# Patient Record
Sex: FEMALE | Race: BLACK OR AFRICAN AMERICAN | NOT HISPANIC OR LATINO | URBAN - METROPOLITAN AREA
[De-identification: names, ages, dates, MRNs, and addresses within clinical notes are randomized per-mention and may not be internally consistent; named-entity substitution may affect disease eponyms.]

---

## 2019-01-11 ENCOUNTER — EMERGENCY (EMERGENCY)
Facility: HOSPITAL | Age: 59
LOS: 1 days | Discharge: HOME | End: 2019-01-11
Attending: EMERGENCY MEDICINE
Payer: COMMERCIAL

## 2019-01-11 VITALS
TEMPERATURE: 98 F | HEIGHT: 66 IN | SYSTOLIC BLOOD PRESSURE: 164 MMHG | RESPIRATION RATE: 16 BRPM | WEIGHT: 149.91 LBS | HEART RATE: 86 BPM | OXYGEN SATURATION: 97 % | DIASTOLIC BLOOD PRESSURE: 94 MMHG

## 2019-01-11 LAB
HCT VFR BLD CALC: 38.7 % — SIGNIFICANT CHANGE UP (ref 34.5–45)
HGB BLD-MCNC: 12.5 G/DL — SIGNIFICANT CHANGE UP (ref 11.5–15.5)
MCHC RBC-ENTMCNC: 26.2 PG — LOW (ref 27–34)
MCHC RBC-ENTMCNC: 32.3 GM/DL — SIGNIFICANT CHANGE UP (ref 32–36)
MCV RBC AUTO: 81.1 FL — SIGNIFICANT CHANGE UP (ref 80–100)
PLATELET # BLD AUTO: 182 K/UL — SIGNIFICANT CHANGE UP (ref 150–400)
RBC # BLD: 4.77 M/UL — SIGNIFICANT CHANGE UP (ref 3.8–5.2)
RBC # FLD: 12.5 % — SIGNIFICANT CHANGE UP (ref 10.3–14.5)
WBC # BLD: 3.7 K/UL — LOW (ref 3.8–10.5)
WBC # FLD AUTO: 3.7 K/UL — LOW (ref 3.8–10.5)

## 2019-01-11 PROCEDURE — 99284 EMERGENCY DEPT VISIT MOD MDM: CPT

## 2019-01-11 NOTE — ED ADULT NURSE NOTE - OBJECTIVE STATEMENT
58 y.o female presents to ED from home, told to come to ED by PCP for hemoglobin A1C of 14. Pt has physical on 12/27 which showed elevated hemoglobin A1C, had blood redrawn and was still same level, told to come to ED for further assessment since PCP was out of office. Pt presents without symptoms. Says she has been drinking more water lately, but because she is trying to lose weight and not because of increased thirst. Denying n/v/d, chest pain, SOB, cough, fever, chills. Lungs clear b/l. Skin warm, dry, intact. Gross motor and neuro intact. 20G IV placed in R hand.

## 2019-01-11 NOTE — ED ADULT TRIAGE NOTE - CHIEF COMPLAINT QUOTE
pt had blood drawn during her physical on 12/27 and was called today and told her hemoglobin A1C is 14 and was told to come to the ED  pt has no complaints

## 2019-01-11 NOTE — ED ADULT NURSE NOTE - NSIMPLEMENTINTERV_GEN_ALL_ED
Implemented All Universal Safety Interventions:  [place] to call system. Call bell, personal items and telephone within reach. Instruct patient to call for assistance. Room bathroom lighting operational. Non-slip footwear when patient is off stretcher. Physically safe environment: no spills, clutter or unnecessary equipment. Stretcher in lowest position, wheels locked, appropriate side rails in place.

## 2019-01-12 VITALS
DIASTOLIC BLOOD PRESSURE: 90 MMHG | RESPIRATION RATE: 18 BRPM | HEART RATE: 84 BPM | SYSTOLIC BLOOD PRESSURE: 156 MMHG | OXYGEN SATURATION: 98 %

## 2019-01-12 LAB
ALBUMIN SERPL ELPH-MCNC: 4.2 G/DL — SIGNIFICANT CHANGE UP (ref 3.3–5)
ALP SERPL-CCNC: 133 U/L — HIGH (ref 40–120)
ALT FLD-CCNC: 16 U/L — SIGNIFICANT CHANGE UP (ref 10–45)
ANION GAP SERPL CALC-SCNC: 13 MMOL/L — SIGNIFICANT CHANGE UP (ref 5–17)
APPEARANCE UR: CLEAR — SIGNIFICANT CHANGE UP
AST SERPL-CCNC: 12 U/L — SIGNIFICANT CHANGE UP (ref 10–40)
BACTERIA # UR AUTO: NEGATIVE — SIGNIFICANT CHANGE UP
BILIRUB SERPL-MCNC: 0.3 MG/DL — SIGNIFICANT CHANGE UP (ref 0.2–1.2)
BILIRUB UR-MCNC: NEGATIVE — SIGNIFICANT CHANGE UP
BUN SERPL-MCNC: 12 MG/DL — SIGNIFICANT CHANGE UP (ref 7–23)
CALCIUM SERPL-MCNC: 9.5 MG/DL — SIGNIFICANT CHANGE UP (ref 8.4–10.5)
CHLORIDE SERPL-SCNC: 99 MMOL/L — SIGNIFICANT CHANGE UP (ref 96–108)
CO2 SERPL-SCNC: 26 MMOL/L — SIGNIFICANT CHANGE UP (ref 22–31)
COLOR SPEC: SIGNIFICANT CHANGE UP
CREAT SERPL-MCNC: 0.51 MG/DL — SIGNIFICANT CHANGE UP (ref 0.5–1.3)
DIFF PNL FLD: NEGATIVE — SIGNIFICANT CHANGE UP
EPI CELLS # UR: 0 /HPF — SIGNIFICANT CHANGE UP
GLUCOSE SERPL-MCNC: 248 MG/DL — HIGH (ref 70–99)
GLUCOSE UR QL: ABNORMAL
HYALINE CASTS # UR AUTO: 0 /LPF — SIGNIFICANT CHANGE UP (ref 0–2)
KETONES UR-MCNC: ABNORMAL
LEUKOCYTE ESTERASE UR-ACNC: NEGATIVE — SIGNIFICANT CHANGE UP
NITRITE UR-MCNC: NEGATIVE — SIGNIFICANT CHANGE UP
PH UR: 6.5 — SIGNIFICANT CHANGE UP (ref 5–8)
POTASSIUM SERPL-MCNC: 4 MMOL/L — SIGNIFICANT CHANGE UP (ref 3.5–5.3)
POTASSIUM SERPL-SCNC: 4 MMOL/L — SIGNIFICANT CHANGE UP (ref 3.5–5.3)
PROT SERPL-MCNC: 7.4 G/DL — SIGNIFICANT CHANGE UP (ref 6–8.3)
PROT UR-MCNC: ABNORMAL
RBC CASTS # UR COMP ASSIST: 1 /HPF — SIGNIFICANT CHANGE UP (ref 0–4)
SODIUM SERPL-SCNC: 138 MMOL/L — SIGNIFICANT CHANGE UP (ref 135–145)
SP GR SPEC: 1.02 — SIGNIFICANT CHANGE UP (ref 1.01–1.02)
UROBILINOGEN FLD QL: NEGATIVE — SIGNIFICANT CHANGE UP
WBC UR QL: 1 /HPF — SIGNIFICANT CHANGE UP (ref 0–5)

## 2019-01-12 PROCEDURE — 99283 EMERGENCY DEPT VISIT LOW MDM: CPT

## 2019-01-12 PROCEDURE — 81001 URINALYSIS AUTO W/SCOPE: CPT

## 2019-01-12 PROCEDURE — 85027 COMPLETE CBC AUTOMATED: CPT

## 2019-01-12 PROCEDURE — 82962 GLUCOSE BLOOD TEST: CPT

## 2019-01-12 PROCEDURE — 80053 COMPREHEN METABOLIC PANEL: CPT

## 2019-01-12 NOTE — ED PROVIDER NOTE - OBJECTIVE STATEMENT
57 yo otherwise healthy F sent in by PMD for elevated A1C 14, called today and told about results from 12/27. Pt reports one episode blurry vision two days ago, no pain, no current visual changes. Polydipsia, denies increased urination. No fevers/chills, no abdominal pain, no diarrhea/constipation.    No family hx DM2, sone with juvenile DM

## 2019-01-12 NOTE — ED PROVIDER NOTE - PRINCIPAL DIAGNOSIS
Diabetes mellitus Type 2 diabetes mellitus with hyperglycemia, unspecified whether long term insulin use

## 2019-01-12 NOTE — ED PROVIDER NOTE - PROGRESS NOTE DETAILS
Virgie PGY2: discussed lab findings with pt, Glucose 223, no anion gap, will dc with rec fu pcp for starting management. Attending MD Astudillo: Discussed labs, emphasized as before.  Stable for discharge. Follow up instructions given, importance of follow up emphasized, return to ED parameters reviewed and patient verbalized understanding.  All questions answered, all concerns addressed.

## 2019-01-12 NOTE — ED PROVIDER NOTE - ATTENDING CONTRIBUTION TO CARE
Attending MD Astudillo: I personally have seen and examined this patient.  Resident note reviewed and agree on plan of care and except where noted.  See below for details.     Seen in [place] [facility]     58F with PMH/PSH including asthma, hysterectomy, appendectomy, "gland in neck with stone", bilateral ovarian sx presents to the ED with elevated Hb A1c.  Reports that she went to her PMD on 12/27/18 and had blood tests.  Reports on Wednesday was told about her elevated HbA1c and was told to follow up.  Reports she has been having some fluctuations in her vision, but no sudden vision loss or double vision.  Reports this has been happening for months.  Reports that she also has polydipsia, but reports urinates frequently at baseline and has not noted an increased, denies dysuria or urgency.  Denies fevers, chills. Denies abdominal pain, nausea, vomiting, diarrhea, blood in stools. Reports that at her latest visit was told that she had elevated blood pressure as well and was started on a medication (5mg of ?) but did not pick it up.  Denies EtOH, tobacco, drugs.  Reports allergy to Amoxicillin.  Reports recent INTENTIONAL 25lb weight loss.  On exam, NAD, head NCAT, PERRL, FROM at neck, no tenderness to midline palpation, no stepoffs along length of spine, lungs CTAB with good inspiratory effort, +S1S2, no m/r/g, abdomen soft with +BS, NT, ND, no CVAT, moving all extremities with 5/5 strength bilateral upper and lower extremities, good and equal  strength bilaterally, sensory grossly intact, no pitting edema, no calf tenderness, swelling, erythema or warmth; A/P: 58F with abnormal lab, elevated Hb A1c, needs improved outpatient glucose control, explained nothing to do acutely if labs are nonactionable (will rule out DKA or HHS, although doubt), explained need outpatient follow up and should  medications prescribed to her and take medications as directed and not stop or start new meds without discussing with her physician.  Verbalized understanding.

## 2019-01-12 NOTE — ED PROVIDER NOTE - CARE PLAN
Principal Discharge DX:	Diabetes mellitus Principal Discharge DX:	Type 2 diabetes mellitus with hyperglycemia, unspecified whether long term insulin use

## 2019-01-12 NOTE — ED PROVIDER NOTE - NSFOLLOWUPINSTRUCTIONS_ED_ALL_ED_FT
Rest, drink plenty of fluids.  Advance activity as tolerated.  Continue all previously prescribed medications as directed.  Follow up with your primary care physician in 48-72 hours- bring copies of your results.  Return to the ER for worsening or persistent symptoms, and/or ANY NEW OR CONCERNING SYMPTOMS. If you have issues obtaining follow up, please call: [phone] to obtain a doctor or specialist who takes your insurance in your area.

## 2020-10-17 ENCOUNTER — EMERGENCY (EMERGENCY)
Facility: HOSPITAL | Age: 60
LOS: 1 days | Discharge: HOME | End: 2020-10-17
Attending: EMERGENCY MEDICINE
Payer: COMMERCIAL

## 2020-10-17 VITALS
TEMPERATURE: 98 F | RESPIRATION RATE: 18 BRPM | HEIGHT: 66 IN | OXYGEN SATURATION: 99 % | WEIGHT: 132.06 LBS | DIASTOLIC BLOOD PRESSURE: 81 MMHG | SYSTOLIC BLOOD PRESSURE: 146 MMHG | HEART RATE: 76 BPM

## 2020-10-17 PROCEDURE — 70450 CT HEAD/BRAIN W/O DYE: CPT | Mod: 26

## 2020-10-17 PROCEDURE — 99053 MED SERV 10PM-8AM 24 HR FAC: CPT

## 2020-10-17 PROCEDURE — 99284 EMERGENCY DEPT VISIT MOD MDM: CPT

## 2020-10-17 PROCEDURE — 99284 EMERGENCY DEPT VISIT MOD MDM: CPT | Mod: 25

## 2020-10-17 PROCEDURE — 70450 CT HEAD/BRAIN W/O DYE: CPT

## 2020-10-17 PROCEDURE — 73140 X-RAY EXAM OF FINGER(S): CPT | Mod: 26,LT

## 2020-10-17 PROCEDURE — 73140 X-RAY EXAM OF FINGER(S): CPT

## 2020-10-17 RX ORDER — LIDOCAINE 4 G/100G
1 CREAM TOPICAL ONCE
Refills: 0 | Status: COMPLETED | OUTPATIENT
Start: 2020-10-17 | End: 2020-10-17

## 2020-10-17 RX ORDER — ACETAMINOPHEN 500 MG
975 TABLET ORAL ONCE
Refills: 0 | Status: COMPLETED | OUTPATIENT
Start: 2020-10-17 | End: 2020-10-17

## 2020-10-17 RX ADMIN — LIDOCAINE 1 PATCH: 4 CREAM TOPICAL at 23:24

## 2020-10-17 RX ADMIN — Medication 975 MILLIGRAM(S): at 23:24
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
## 2020-10-17 NOTE — ED ADULT NURSE NOTE - CAS EDN DISCHARGE ASSESSMENT
Alert and oriented to person, place and time Alert and oriented to person, place and time/Pt given finger splint, MD Mosqueda at bedside to give patient results

## 2020-10-17 NOTE — ED PROVIDER NOTE - PATIENT PORTAL LINK FT
You can access the FollowMyHealth Patient Portal offered by [facility] by registering at the following website: [email or web address]. By joining [facility]’s FollowMyHealth portal, you will also be able to view your health information using other applications (apps) compatible with our system.

## 2020-10-17 NOTE — ED PROVIDER NOTE - NEUROLOGICAL, MLM
PATIENT DID NOT GO TO PST/ DID NOT GET SPONGES, NOTHING ORDERED BY SURGEON Alert and oriented, no focal deficits, no motor or sensory deficits.
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
## 2020-10-17 NOTE — ED PROVIDER NOTE - NSFOLLOWUPCLINICS_GEN_ALL_ED_FT
Concussion Program  Concussion  .  [place]   Phone: [phone]  Fax:   Follow Up Time: 1-3 Days    [facility]  Orthopedics  .  [place]   Phone: [phone]  Fax:   Follow Up Time: 1-3 Days
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
## 2020-10-18 VITALS
SYSTOLIC BLOOD PRESSURE: 139 MMHG | TEMPERATURE: 98 F | RESPIRATION RATE: 18 BRPM | HEART RATE: 90 BPM | DIASTOLIC BLOOD PRESSURE: 85 MMHG | OXYGEN SATURATION: 100 %

## 2020-10-18 NOTE — ED PROCEDURE NOTE - PROCEDURE ADDITIONAL DETAILS
Pt placed in splint due to pain and swelling at L index finger PIP joint with decreased flexion. Xray w/o fracture, possible ligamentous injury vs swelling.

## 2020-10-19 ENCOUNTER — APPOINTMENT (OUTPATIENT)
Dept: ORTHOPEDIC SURGERY | Facility: CLINIC | Age: 60
End: 2020-10-19
Payer: COMMERCIAL

## 2020-10-19 VITALS
HEART RATE: 86 BPM | HEIGHT: 65 IN | SYSTOLIC BLOOD PRESSURE: 138 MMHG | WEIGHT: 133 LBS | DIASTOLIC BLOOD PRESSURE: 81 MMHG | BODY MASS INDEX: 22.16 KG/M2

## 2020-10-19 PROCEDURE — 99204 OFFICE O/P NEW MOD 45 MIN: CPT

## 2020-10-19 PROCEDURE — 99072 ADDL SUPL MATRL&STAF TM PHE: CPT

## 2020-10-19 NOTE — DISCUSSION/SUMMARY
[de-identified] : We discussed the definition of concussion and symptoms at length\par Reviewed risk factors for prolonged concussion recovery\par Discussed physical and mental rest at length\par Discussed modification of activities at work at length\par Scheduled tylenol alternating with ibuprofen for headaches\par zofran prescribed for headaches\par Heating pad for neck pain\par discussed possible PT referral for vestibulocular rehab/balance training\par Report any worsening symptoms\par Discussed the importance of sleep hygiene\par No driving unless cleared\par No alcohol\par No activities that would increase heart rate until cleared\par Follow up in one week      \par \par Lola Barron MD, EdM\par Sports Medicine PM&R\par
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
## 2020-10-19 NOTE — PHYSICAL EXAM
[de-identified] : Exam:\par Alert and oriented to place, time, date, year\par Alert no acute distress\par Answers questions appropriately\par Neck full range of motion\par No Tenderness to palpation of the neck\par Cranial nerves intact\par Extraocular movements are intact; No nystagmus\par no Pain with upward lateral or lateral gaze: \par Strength in upper and lower extremities is 5 out of 5 with no focal deficits\par Normal sensation\par Photophobia +:\par Nod testing +\par Side to side head movement +\par Convergence 10cm\par Finger to nose is appropriate\par Romberg testing is negative\par Heel to toe, toe to heel normal\par Modified NOE\par double leg stance with 0 errors\par single leg stance with >4 errors\par tandem stance with>4 errors\par \par

## 2020-10-20 PROBLEM — E11.9 TYPE 2 DIABETES MELLITUS WITHOUT COMPLICATIONS: Chronic | Status: ACTIVE | Noted: 2020-10-19

## 2020-10-20 PROBLEM — E78.5 HYPERLIPIDEMIA, UNSPECIFIED: Chronic | Status: ACTIVE | Noted: 2020-10-19

## 2020-10-20 PROBLEM — I10 ESSENTIAL (PRIMARY) HYPERTENSION: Chronic | Status: ACTIVE | Noted: 2020-10-19

## 2020-10-21 ENCOUNTER — APPOINTMENT (OUTPATIENT)
Dept: ORTHOPEDIC SURGERY | Facility: CLINIC | Age: 60
End: 2020-10-21
Payer: COMMERCIAL

## 2020-10-21 PROCEDURE — 99214 OFFICE O/P EST MOD 30 MIN: CPT

## 2020-10-21 PROCEDURE — 72040 X-RAY EXAM NECK SPINE 2-3 VW: CPT

## 2020-10-21 PROCEDURE — 99072 ADDL SUPL MATRL&STAF TM PHE: CPT

## 2020-10-23 ENCOUNTER — APPOINTMENT (OUTPATIENT)
Dept: ORTHOPEDIC SURGERY | Facility: CLINIC | Age: 60
End: 2020-10-23
Payer: COMMERCIAL

## 2020-10-23 PROCEDURE — 99072 ADDL SUPL MATRL&STAF TM PHE: CPT

## 2020-10-23 PROCEDURE — 20600 DRAIN/INJ JOINT/BURSA W/O US: CPT | Mod: F1

## 2020-10-23 PROCEDURE — 99214 OFFICE O/P EST MOD 30 MIN: CPT | Mod: 25

## 2020-10-26 ENCOUNTER — APPOINTMENT (OUTPATIENT)
Dept: ORTHOPEDIC SURGERY | Facility: CLINIC | Age: 60
End: 2020-10-26
Payer: COMMERCIAL

## 2020-10-26 VITALS
DIASTOLIC BLOOD PRESSURE: 78 MMHG | SYSTOLIC BLOOD PRESSURE: 135 MMHG | WEIGHT: 133 LBS | HEART RATE: 82 BPM | BODY MASS INDEX: 22.16 KG/M2 | HEIGHT: 65 IN

## 2020-10-26 PROCEDURE — 99214 OFFICE O/P EST MOD 30 MIN: CPT

## 2020-10-26 PROCEDURE — 99072 ADDL SUPL MATRL&STAF TM PHE: CPT

## 2020-10-26 NOTE — HISTORY OF PRESENT ILLNESS
[de-identified] : Aishwarya presents for follow up of concussion and neck pain.  Her last appointment was on 10/20/20 at which time I referred her to interventional spine for her neck and neurology for her headaches.  She returns today feeling no improvement.  She has not made any appointments with the other providers.  She has not started PT.  She is not currently working.

## 2020-10-26 NOTE — DISCUSSION/SUMMARY
[de-identified] : Aishwarya and I discussed her pain.  For her concussion, I have assured her that all of the symptoms she is feeling are normal for a post-concussive state.  I once again recommended she start PT.  Given the severity of her headaches and persistent symptoms, I recommend she follow up with a TBI physician vs neurology, as I have no further recommendations for her care.  \par I have advised her that she will need to rest and avoid intense activity.  She also appears to be anxious about her symptoms, which is likely making them worse. Her headaches may also have a cervical component for which she will be seeing Dr. Mary this week.  Follow up as needed.\par \par Lola Barron MD, EdM\par Sports Medicine PM&R\par Department of Orthopedics\par

## 2020-10-26 NOTE — PHYSICAL EXAM
[de-identified] : EXAM: \par Gen: in no acute distress, seated comfortably, moving easily\par Skin: No discoloration, rashes; on palpation skin is dry, \par Neuro: Normal sensation all dermatomes, motor all myotomes\par Vascular: Normal pulses, no edema, normal temperature\par Coordination and balance: Normal\par Psych: normal mood and affect, non pressured speech, alert and oriented x3\par Musculoskeletal:\par cervical spine\par Inspection reveals head forward posture\par Range of motion testing shows restricted and painful ROM with rotation\par spurlings -\par + tenderness to palpation of paraspinal muscles. \par + TTP trapezius, splenius capitus, levator, rhomboids\par NEURO - Normal bulk and tone \par UE strength 5/5 including shoulder abduction, biceps, triceps, wrist extensors, finger flexors, interossei, and APB \par Sensation - intact to light touch in bilateral upper extremities. \par UE Reflexes 2+ biceps, triceps, brachioradialis \par LE Reflexes  2+ patellar and Achilles reflexes bilaterally \par No Hoffmans\par Coordination was age appropriate and intact in all 4 limbs. \par GAIT - Normal base, normal stride length, non-antalgic \par \par

## 2020-10-28 ENCOUNTER — APPOINTMENT (OUTPATIENT)
Dept: PHYSICAL MEDICINE AND REHAB | Facility: CLINIC | Age: 60
End: 2020-10-28
Payer: COMMERCIAL

## 2020-10-28 VITALS
BODY MASS INDEX: 22.16 KG/M2 | DIASTOLIC BLOOD PRESSURE: 82 MMHG | TEMPERATURE: 97.3 F | HEIGHT: 65 IN | OXYGEN SATURATION: 100 % | SYSTOLIC BLOOD PRESSURE: 150 MMHG | WEIGHT: 133 LBS | HEART RATE: 84 BPM

## 2020-10-28 DIAGNOSIS — Z87.09 PERSONAL HISTORY OF OTHER DISEASES OF THE RESPIRATORY SYSTEM: ICD-10-CM

## 2020-10-28 DIAGNOSIS — Z82.49 FAMILY HISTORY OF ISCHEMIC HEART DISEASE AND OTHER DISEASES OF THE CIRCULATORY SYSTEM: ICD-10-CM

## 2020-10-28 DIAGNOSIS — Z78.9 OTHER SPECIFIED HEALTH STATUS: ICD-10-CM

## 2020-10-28 PROCEDURE — 99215 OFFICE O/P EST HI 40 MIN: CPT

## 2020-10-28 PROCEDURE — 99072 ADDL SUPL MATRL&STAF TM PHE: CPT
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
## 2020-10-28 NOTE — PHYSICAL EXAM
[Normal] : Oriented to person, place, and time, insight and judgement were intact and the affect was normal [FreeTextEntry1] : C Spine: no gross deformity, TTP over the right cervical paraspinals, ROM limited in all planes, positive Spurling's on the right, negative Jefferson's\par \par \par L spine: No gross deformity, TTP of the lumbar paraspinals: right, ROM limited with all planes, worst with flexion, SLR negative\par

## 2020-11-02 ENCOUNTER — APPOINTMENT (OUTPATIENT)
Dept: PHYSICAL MEDICINE AND REHAB | Facility: CLINIC | Age: 60
End: 2020-11-02
Payer: COMMERCIAL

## 2020-11-02 VITALS
SYSTOLIC BLOOD PRESSURE: 146 MMHG | WEIGHT: 133 LBS | TEMPERATURE: 98.1 F | HEART RATE: 73 BPM | DIASTOLIC BLOOD PRESSURE: 89 MMHG | BODY MASS INDEX: 22.16 KG/M2 | OXYGEN SATURATION: 97 % | HEIGHT: 65 IN

## 2020-11-02 DIAGNOSIS — Z86.39 PERSONAL HISTORY OF OTHER ENDOCRINE, NUTRITIONAL AND METABOLIC DISEASE: ICD-10-CM

## 2020-11-02 DIAGNOSIS — Z80.9 FAMILY HISTORY OF MALIGNANT NEOPLASM, UNSPECIFIED: ICD-10-CM

## 2020-11-02 DIAGNOSIS — S06.0X9A CONCUSSION WITH LOSS OF CONSCIOUSNESS OF UNSPECIFIED DURATION, INITIAL ENCOUNTER: ICD-10-CM

## 2020-11-02 PROCEDURE — 99072 ADDL SUPL MATRL&STAF TM PHE: CPT

## 2020-11-02 PROCEDURE — 99214 OFFICE O/P EST MOD 30 MIN: CPT

## 2020-11-02 RX ORDER — FLASH GLUCOSE SENSOR
KIT MISCELLANEOUS
Qty: 2 | Refills: 0 | Status: ACTIVE | COMMUNITY
Start: 2020-08-28

## 2020-11-02 RX ORDER — CYCLOBENZAPRINE HYDROCHLORIDE 5 MG/1
5 TABLET, FILM COATED ORAL
Qty: 28 | Refills: 0 | Status: DISCONTINUED | COMMUNITY
Start: 2020-10-21 | End: 2020-11-02

## 2020-11-02 RX ORDER — ONDANSETRON 4 MG/1
4 TABLET, ORALLY DISINTEGRATING ORAL EVERY 8 HOURS
Qty: 14 | Refills: 0 | Status: DISCONTINUED | COMMUNITY
Start: 2020-10-19 | End: 2020-11-02

## 2020-11-02 RX ORDER — CYCLOBENZAPRINE HYDROCHLORIDE 10 MG/1
10 TABLET, FILM COATED ORAL
Qty: 30 | Refills: 0 | Status: DISCONTINUED | COMMUNITY
Start: 2020-10-28 | End: 2020-11-02
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
## 2020-11-02 NOTE — PHYSICAL EXAM
[Normal] : Alert and in no acute distress [de-identified] : seated in chair [de-identified] : EOMI- smooth pursuits normal  [de-identified] : AROM limited in all planes [de-identified] : Breathing comfortably  [de-identified] : Limited active ROM right shoulder abduction [de-identified] : aaox3, no aphasia or dysarthria, Immediate recall 3/3, delayed in 5 minutes 1/3, 2/3 with multiple choices, Facial sensation intact, no facial weakness, Motor LUE 5/5 except left index finger with limited flexion, Right shoulder Active abduction - limited to 90*, otherwise 5/5, LE 5/5, sensory intact to light touch, fine motor coordination intact, no pronator drift, Gait normal, tandem walking intact. Tandem stance is intact, Romberg test is negative

## 2020-11-06 ENCOUNTER — APPOINTMENT (OUTPATIENT)
Dept: MRI IMAGING | Facility: CLINIC | Age: 60
End: 2020-11-06
Payer: COMMERCIAL

## 2020-11-06 ENCOUNTER — OUTPATIENT (OUTPATIENT)
Dept: OUTPATIENT SERVICES | Facility: HOSPITAL | Age: 60
LOS: 1 days | End: 2020-11-06
Payer: COMMERCIAL

## 2020-11-06 DIAGNOSIS — S39.012A STRAIN OF MUSCLE, FASCIA AND TENDON OF LOWER BACK, INITIAL ENCOUNTER: ICD-10-CM

## 2020-11-06 PROCEDURE — 72148 MRI LUMBAR SPINE W/O DYE: CPT | Mod: 26

## 2020-11-06 PROCEDURE — 72141 MRI NECK SPINE W/O DYE: CPT | Mod: 26

## 2020-11-06 PROCEDURE — 72148 MRI LUMBAR SPINE W/O DYE: CPT

## 2020-11-06 PROCEDURE — 72141 MRI NECK SPINE W/O DYE: CPT

## 2020-11-08 ENCOUNTER — TRANSCRIPTION ENCOUNTER (OUTPATIENT)
Age: 60
End: 2020-11-08

## 2020-11-09 ENCOUNTER — APPOINTMENT (OUTPATIENT)
Dept: PHYSICAL MEDICINE AND REHAB | Facility: CLINIC | Age: 60
End: 2020-11-09
Payer: COMMERCIAL

## 2020-11-09 DIAGNOSIS — G44.309 POST-TRAUMATIC HEADACHE, UNSPECIFIED, NOT INTRACTABLE: ICD-10-CM

## 2020-11-09 PROCEDURE — 99212 OFFICE O/P EST SF 10 MIN: CPT | Mod: 95

## 2020-11-09 RX ORDER — MELOXICAM 7.5 MG/1
7.5 TABLET ORAL
Qty: 14 | Refills: 0 | Status: ACTIVE | COMMUNITY
Start: 2020-10-21 | End: 1900-01-01
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
## 2020-11-09 NOTE — PHYSICAL EXAM
[FreeTextEntry1] : Exam is limited due to the nature of this visit.\par She appears comfortable and in NAD
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
## 2020-11-09 NOTE — REVIEW OF SYSTEMS
[Fever] : no fever [Headache] : headache [Dizziness] : dizziness [Fainting] : no fainting [Negative] : Eyes

## 2020-11-09 NOTE — REASON FOR VISIT
[Home] : at home, [unfilled] , at the time of the visit. [Medical Office: ([facility])___] : at the medical office located in  [Verbal consent obtained from patient] : the patient, [unfilled] [Follow-Up] : a follow-up visit

## 2020-11-11 ENCOUNTER — APPOINTMENT (OUTPATIENT)
Dept: PHYSICAL MEDICINE AND REHAB | Facility: CLINIC | Age: 60
End: 2020-11-11
Payer: COMMERCIAL

## 2020-11-11 VITALS
DIASTOLIC BLOOD PRESSURE: 92 MMHG | HEART RATE: 76 BPM | OXYGEN SATURATION: 99 % | SYSTOLIC BLOOD PRESSURE: 142 MMHG | TEMPERATURE: 97.6 F

## 2020-11-11 PROCEDURE — 99072 ADDL SUPL MATRL&STAF TM PHE: CPT

## 2020-11-11 PROCEDURE — 99214 OFFICE O/P EST MOD 30 MIN: CPT

## 2020-11-14 NOTE — ASSESSMENT
[FreeTextEntry1] : MRI discussed, just started PT, too early to evaluate if helping or not. Continue PT. Follow up in 4 weeks.
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
## 2020-12-09 ENCOUNTER — APPOINTMENT (OUTPATIENT)
Dept: PHYSICAL MEDICINE AND REHAB | Facility: CLINIC | Age: 60
End: 2020-12-09
Payer: COMMERCIAL

## 2020-12-09 DIAGNOSIS — S39.012A STRAIN OF MUSCLE, FASCIA AND TENDON OF LOWER BACK, INITIAL ENCOUNTER: ICD-10-CM

## 2020-12-09 PROCEDURE — 99442: CPT

## 2020-12-10 ENCOUNTER — TRANSCRIPTION ENCOUNTER (OUTPATIENT)
Age: 60
End: 2020-12-10

## 2020-12-11 PROBLEM — S39.012A LUMBAR STRAIN: Status: ACTIVE | Noted: 2020-10-28

## 2021-01-14 ENCOUNTER — APPOINTMENT (OUTPATIENT)
Dept: ORTHOPEDIC SURGERY | Facility: CLINIC | Age: 61
End: 2021-01-14
Payer: COMMERCIAL

## 2021-01-14 VITALS
WEIGHT: 135 LBS | SYSTOLIC BLOOD PRESSURE: 132 MMHG | HEIGHT: 65 IN | BODY MASS INDEX: 22.49 KG/M2 | DIASTOLIC BLOOD PRESSURE: 77 MMHG | HEART RATE: 80 BPM

## 2021-01-14 DIAGNOSIS — M19.049 PRIMARY OSTEOARTHRITIS, UNSPECIFIED HAND: ICD-10-CM

## 2021-01-14 DIAGNOSIS — S63.639D SPRAIN OF INTERPHALANGEAL JOINT OF UNSPECIFIED FINGER, SUBSEQUENT ENCOUNTER: ICD-10-CM

## 2021-01-14 PROCEDURE — 73140 X-RAY EXAM OF FINGER(S): CPT | Mod: F1

## 2021-01-14 PROCEDURE — 99214 OFFICE O/P EST MOD 30 MIN: CPT | Mod: 25

## 2021-01-14 PROCEDURE — 99072 ADDL SUPL MATRL&STAF TM PHE: CPT

## 2021-01-14 PROCEDURE — 20600 DRAIN/INJ JOINT/BURSA W/O US: CPT | Mod: F1

## 2021-01-25 ENCOUNTER — TRANSCRIPTION ENCOUNTER (OUTPATIENT)
Age: 61
End: 2021-01-25

## 2021-02-11 ENCOUNTER — TRANSCRIPTION ENCOUNTER (OUTPATIENT)
Age: 61
End: 2021-02-11

## 2021-02-12 ENCOUNTER — APPOINTMENT (OUTPATIENT)
Dept: PAIN MANAGEMENT | Facility: CLINIC | Age: 61
End: 2021-02-12

## 2021-04-21 NOTE — ED ADULT NURSE NOTE - CAS ELECT INFOMATION PROVIDED
What Type Of Note Output Would You Prefer (Optional)?: Bullet Format
How Severe Is Your Acne?: moderate
Is This A New Presentation, Or A Follow-Up?: Acne
DC instructions

## 2021-05-08 ENCOUNTER — EMERGENCY (EMERGENCY)
Facility: HOSPITAL | Age: 61
LOS: 1 days | Discharge: HOME | End: 2021-05-08
Attending: EMERGENCY MEDICINE
Payer: COMMERCIAL

## 2021-05-08 VITALS
HEIGHT: 66 IN | RESPIRATION RATE: 18 BRPM | DIASTOLIC BLOOD PRESSURE: 84 MMHG | HEART RATE: 70 BPM | SYSTOLIC BLOOD PRESSURE: 132 MMHG | WEIGHT: 132.94 LBS | TEMPERATURE: 98 F | OXYGEN SATURATION: 98 %

## 2021-05-08 VITALS
RESPIRATION RATE: 18 BRPM | DIASTOLIC BLOOD PRESSURE: 83 MMHG | SYSTOLIC BLOOD PRESSURE: 149 MMHG | OXYGEN SATURATION: 100 % | HEART RATE: 74 BPM

## 2021-05-08 LAB
ALBUMIN SERPL ELPH-MCNC: 4.1 G/DL — SIGNIFICANT CHANGE UP (ref 3.3–5)
ALP SERPL-CCNC: 108 U/L — SIGNIFICANT CHANGE UP (ref 40–120)
ALT FLD-CCNC: 13 U/L — SIGNIFICANT CHANGE UP (ref 10–45)
ANION GAP SERPL CALC-SCNC: 12 MMOL/L — SIGNIFICANT CHANGE UP (ref 5–17)
APTT BLD: 35.4 SEC — SIGNIFICANT CHANGE UP (ref 27.5–35.5)
AST SERPL-CCNC: 13 U/L — SIGNIFICANT CHANGE UP (ref 10–40)
BASOPHILS # BLD AUTO: 0 K/UL — SIGNIFICANT CHANGE UP (ref 0–0.2)
BASOPHILS NFR BLD AUTO: 0 % — SIGNIFICANT CHANGE UP (ref 0–2)
BILIRUB SERPL-MCNC: 0.2 MG/DL — SIGNIFICANT CHANGE UP (ref 0.2–1.2)
BUN SERPL-MCNC: 17 MG/DL — SIGNIFICANT CHANGE UP (ref 7–23)
CALCIUM SERPL-MCNC: 9.3 MG/DL — SIGNIFICANT CHANGE UP (ref 8.4–10.5)
CHLORIDE SERPL-SCNC: 103 MMOL/L — SIGNIFICANT CHANGE UP (ref 96–108)
CO2 SERPL-SCNC: 25 MMOL/L — SIGNIFICANT CHANGE UP (ref 22–31)
CREAT SERPL-MCNC: 0.57 MG/DL — SIGNIFICANT CHANGE UP (ref 0.5–1.3)
D DIMER BLD IA.RAPID-MCNC: <150 NG/ML DDU — SIGNIFICANT CHANGE UP
EOSINOPHIL # BLD AUTO: 0.06 K/UL — SIGNIFICANT CHANGE UP (ref 0–0.5)
EOSINOPHIL NFR BLD AUTO: 1.7 % — SIGNIFICANT CHANGE UP (ref 0–6)
GLUCOSE SERPL-MCNC: 95 MG/DL — SIGNIFICANT CHANGE UP (ref 70–99)
HCT VFR BLD CALC: 40.3 % — SIGNIFICANT CHANGE UP (ref 34.5–45)
HGB BLD-MCNC: 12.3 G/DL — SIGNIFICANT CHANGE UP (ref 11.5–15.5)
INR BLD: 0.97 RATIO — SIGNIFICANT CHANGE UP (ref 0.88–1.16)
LYMPHOCYTES # BLD AUTO: 0.95 K/UL — LOW (ref 1–3.3)
LYMPHOCYTES # BLD AUTO: 28.7 % — SIGNIFICANT CHANGE UP (ref 13–44)
MANUAL SMEAR VERIFICATION: SIGNIFICANT CHANGE UP
MCHC RBC-ENTMCNC: 26 PG — LOW (ref 27–34)
MCHC RBC-ENTMCNC: 30.5 GM/DL — LOW (ref 32–36)
MCV RBC AUTO: 85.2 FL — SIGNIFICANT CHANGE UP (ref 80–100)
MONOCYTES # BLD AUTO: 0.17 K/UL — SIGNIFICANT CHANGE UP (ref 0–0.9)
MONOCYTES NFR BLD AUTO: 5.2 % — SIGNIFICANT CHANGE UP (ref 2–14)
NEUTROPHILS # BLD AUTO: 2.14 K/UL — SIGNIFICANT CHANGE UP (ref 1.8–7.4)
NEUTROPHILS NFR BLD AUTO: 64.4 % — SIGNIFICANT CHANGE UP (ref 43–77)
PLAT MORPH BLD: NORMAL — SIGNIFICANT CHANGE UP
PLATELET # BLD AUTO: 158 K/UL — SIGNIFICANT CHANGE UP (ref 150–400)
POTASSIUM SERPL-MCNC: 3.5 MMOL/L — SIGNIFICANT CHANGE UP (ref 3.5–5.3)
POTASSIUM SERPL-SCNC: 3.5 MMOL/L — SIGNIFICANT CHANGE UP (ref 3.5–5.3)
PROT SERPL-MCNC: 7.1 G/DL — SIGNIFICANT CHANGE UP (ref 6–8.3)
PROTHROM AB SERPL-ACNC: 11.6 SEC — SIGNIFICANT CHANGE UP (ref 10.6–13.6)
RBC # BLD: 4.73 M/UL — SIGNIFICANT CHANGE UP (ref 3.8–5.2)
RBC # FLD: 13.8 % — SIGNIFICANT CHANGE UP (ref 10.3–14.5)
RBC BLD AUTO: SIGNIFICANT CHANGE UP
SODIUM SERPL-SCNC: 140 MMOL/L — SIGNIFICANT CHANGE UP (ref 135–145)
TROPONIN T, HIGH SENSITIVITY RESULT: <6 NG/L — SIGNIFICANT CHANGE UP (ref 0–51)
WBC # BLD: 3.32 K/UL — LOW (ref 3.8–10.5)
WBC # FLD AUTO: 3.32 K/UL — LOW (ref 3.8–10.5)

## 2021-05-08 PROCEDURE — 99285 EMERGENCY DEPT VISIT HI MDM: CPT

## 2021-05-08 PROCEDURE — 85025 COMPLETE CBC W/AUTO DIFF WBC: CPT

## 2021-05-08 PROCEDURE — 36000 PLACE NEEDLE IN VEIN: CPT

## 2021-05-08 PROCEDURE — 93971 EXTREMITY STUDY: CPT

## 2021-05-08 PROCEDURE — 99284 EMERGENCY DEPT VISIT MOD MDM: CPT | Mod: 25

## 2021-05-08 PROCEDURE — 93971 EXTREMITY STUDY: CPT | Mod: 26,RT

## 2021-05-08 PROCEDURE — 71045 X-RAY EXAM CHEST 1 VIEW: CPT

## 2021-05-08 PROCEDURE — 85610 PROTHROMBIN TIME: CPT

## 2021-05-08 PROCEDURE — 80053 COMPREHEN METABOLIC PANEL: CPT

## 2021-05-08 PROCEDURE — 85379 FIBRIN DEGRADATION QUANT: CPT

## 2021-05-08 PROCEDURE — 85730 THROMBOPLASTIN TIME PARTIAL: CPT

## 2021-05-08 PROCEDURE — 93010 ELECTROCARDIOGRAM REPORT: CPT

## 2021-05-08 PROCEDURE — 71045 X-RAY EXAM CHEST 1 VIEW: CPT | Mod: 26

## 2021-05-08 PROCEDURE — 84484 ASSAY OF TROPONIN QUANT: CPT

## 2021-05-08 PROCEDURE — 93005 ELECTROCARDIOGRAM TRACING: CPT

## 2021-05-08 NOTE — ED PROVIDER NOTE - OBJECTIVE STATEMENT
HTN, HLD, DM on oral med, s/p Cervical Fusion (2/3/2021, by Dr. Elver Cueto) 60yo female pt with PMHx of HTN, HLD, DM on oral med, s/p Cervical Fusion (2/3/2021, by Dr. Elver Cueto) presents to ED with right lower leg swelling/pain and left chest pain. Pt stated she noticed right lower leg pain with swelling this morning and also had intermittent left chest pain for one week. 60yo female pt with PMHx of HTN, HLD, DM on oral med, s/p Cervical Fusion (2/3/2021, by Dr. Elver Cueto) presents to ED with right lower leg swelling/pain and left chest pain. Pt stated she noticed right lower leg pain with swelling this morning and also had intermittent left chest pain for one week. Described chest pain as localized dull pain without radiation and worsening pain with movement. Denies injury. Denies fever, chills, cough or congestion. Denies SOB. Denies back pain. Denies sensory changes or weakness to extremities. Denies ABD pain or N/V/D. Denies urinary problems.

## 2021-05-08 NOTE — ED PROVIDER NOTE - NSFOLLOWUPCLINICS_GEN_ALL_ED_FT
[facility]  Cardiology  [address] [place] [address]  Phone: [phone]  Fax:

## 2021-05-08 NOTE — ED PROVIDER NOTE - ATTENDING CONTRIBUTION TO CARE
RGUJRAL 60yo f hx listed s/p cervical spine fusion in Feb 2021 presents to the ED with R leg pain and swelling. Denies any trauma. States she did travel to [place] but took intermittent breaks. Pain is over the site of swelling. Pt also today noticed L side chest pain that is more positional and feels she pulled a muscle.   No cough, URI, f/c. No numbness, tingling or weakness.   On exam, Patient is awake, alert and oriented x 3.  Patient is well appearing and in no acute distress.  NCAT  Neck is supple, No LAD.  Lungs are CTA B/L,+S1S2 no murmurs,  Abdomen:Soft nd/nt+bs no rebound or guarding.  RLE Lateral aspect with 3-4 cm round localized lesion, ? lipoma. Non tender, no erythema or warmth or discharge.   Skin with no rash.  Neuro CN3-12 intact. Strength 5/5 in upper and lower extremities. Nml Sensation.Gait normal.   Check EKG, labs including DDimer, RLE duplex and re eval.
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
## 2021-05-08 NOTE — ED PROVIDER NOTE - PROGRESS NOTE DETAILS
Pt went US. Discussed results with patient. No chest pain at this time, EKG and labs reviewed. Pt has a PCP and will follow up. RLE precautions given for any worsening swelling or erythema. Vascular follow up and return precautions. RGUJRAL

## 2021-05-08 NOTE — ED PROVIDER NOTE - CARE PROVIDER_API CALL
Denzel Boggs)  Vascular Surgery  [address] [facility], [address]  [place] [address]  Phone: [phone]  Fax: [phone]  Follow Up Time:

## 2021-05-08 NOTE — ED PROVIDER NOTE - PATIENT PORTAL LINK FT
You can access the FollowMyHealth Patient Portal offered by [facility] by registering at the following website: [email or web address]. By joining [employer]’s FollowMyHealth portal, you will also be able to view your health information using other applications (apps) compatible with our system.

## 2021-05-08 NOTE — ED ADULT NURSE REASSESSMENT NOTE - NS ED NURSE REASSESS COMMENT FT1
pt with negative dopler no troponin elevation pt for d/c with vascular and cardiology follow up md spoke with pt prior to discharge vitals stable

## 2021-05-08 NOTE — ED PROVIDER NOTE - NSFOLLOWUPINSTRUCTIONS_ED_ALL_ED_FT
Keep continue your current medications including your pain medications as directed.    Elevate the affected leg.    Follow up with your spine surgeon as scheduled.    Follow up with your primary Dr. for reevaluation and cardiology referral, call Monday for an appointment.    Or follow up with cardiology clinic, [phone], call Monday for an appointment.    Follow up with vascular surgery Dr. Boggs for reevaluation, call Monday for an appointment in 2-3days.    Please see the information of chest pain.    Return for any concerns or worsening symptoms.

## 2021-05-08 NOTE — ED PROVIDER NOTE - PHYSICAL EXAMINATION
NAD, VSS, Afebrile, Lungs clear. ABD soft, non tender. No CVA tender. + RLE; swelling on proximal/lateral aspect and high ankle area with tender. Skin intact without infection signs. N/V- intact.

## 2021-05-11 ENCOUNTER — APPOINTMENT (OUTPATIENT)
Dept: VASCULAR SURGERY | Facility: CLINIC | Age: 61
End: 2021-05-11
Payer: COMMERCIAL

## 2021-05-11 ENCOUNTER — NON-APPOINTMENT (OUTPATIENT)
Age: 61
End: 2021-05-11

## 2021-05-11 ENCOUNTER — APPOINTMENT (OUTPATIENT)
Dept: CARDIOLOGY | Facility: CLINIC | Age: 61
End: 2021-05-11
Payer: COMMERCIAL

## 2021-05-11 VITALS
DIASTOLIC BLOOD PRESSURE: 88 MMHG | HEIGHT: 65 IN | WEIGHT: 135 LBS | TEMPERATURE: 98 F | BODY MASS INDEX: 22.49 KG/M2 | SYSTOLIC BLOOD PRESSURE: 151 MMHG | HEART RATE: 79 BPM

## 2021-05-11 VITALS
HEART RATE: 83 BPM | OXYGEN SATURATION: 99 % | HEIGHT: 65 IN | WEIGHT: 133 LBS | BODY MASS INDEX: 22.16 KG/M2 | DIASTOLIC BLOOD PRESSURE: 80 MMHG | SYSTOLIC BLOOD PRESSURE: 129 MMHG

## 2021-05-11 DIAGNOSIS — M54.12 RADICULOPATHY, CERVICAL REGION: ICD-10-CM

## 2021-05-11 DIAGNOSIS — I10 ESSENTIAL (PRIMARY) HYPERTENSION: ICD-10-CM

## 2021-05-11 DIAGNOSIS — E11.9 TYPE 2 DIABETES MELLITUS W/OUT COMPLICATIONS: ICD-10-CM

## 2021-05-11 DIAGNOSIS — R07.89 OTHER CHEST PAIN: ICD-10-CM

## 2021-05-11 DIAGNOSIS — E78.5 HYPERLIPIDEMIA, UNSPECIFIED: ICD-10-CM

## 2021-05-11 DIAGNOSIS — I87.2 VENOUS INSUFFICIENCY (CHRONIC) (PERIPHERAL): ICD-10-CM

## 2021-05-11 PROCEDURE — 93000 ELECTROCARDIOGRAM COMPLETE: CPT

## 2021-05-11 PROCEDURE — 93971 EXTREMITY STUDY: CPT

## 2021-05-11 PROCEDURE — 99204 OFFICE O/P NEW MOD 45 MIN: CPT

## 2021-05-11 PROCEDURE — 99203 OFFICE O/P NEW LOW 30 MIN: CPT

## 2021-05-11 PROCEDURE — 99072 ADDL SUPL MATRL&STAF TM PHE: CPT

## 2021-05-11 RX ORDER — METHYLPREDNISOLONE 4 MG/1
4 TABLET ORAL
Qty: 1 | Refills: 0 | Status: DISCONTINUED | COMMUNITY
Start: 2020-10-28 | End: 2021-05-11

## 2021-05-11 RX ORDER — AMLODIPINE AND OLMESARTAN MEDOXOMIL 5; 20 MG/1; MG/1
5-20 TABLET ORAL
Refills: 0 | Status: ACTIVE | COMMUNITY

## 2021-05-11 RX ORDER — FLUTICASONE PROPIONATE AND SALMETEROL 50; 500 UG/1; UG/1
POWDER RESPIRATORY (INHALATION)
Refills: 0 | Status: DISCONTINUED | COMMUNITY
End: 2021-05-11

## 2021-05-11 RX ORDER — METFORMIN ER 500 MG 500 MG/1
500 TABLET ORAL
Qty: 360 | Refills: 0 | Status: DISCONTINUED | COMMUNITY
Start: 2020-08-28 | End: 2021-05-11

## 2021-05-11 RX ORDER — ATORVASTATIN CALCIUM 20 MG/1
20 TABLET, FILM COATED ORAL
Qty: 30 | Refills: 0 | Status: ACTIVE | COMMUNITY

## 2021-05-11 RX ORDER — METFORMIN HYDROCHLORIDE 500 MG/1
500 TABLET, COATED ORAL TWICE DAILY
Qty: 360 | Refills: 3 | Status: ACTIVE | COMMUNITY

## 2021-05-11 RX ORDER — ALBUTEROL SULFATE 2.5 MG/.5ML
SOLUTION RESPIRATORY (INHALATION)
Refills: 0 | Status: DISCONTINUED | COMMUNITY
End: 2021-05-11

## 2021-05-11 RX ORDER — INSULIN DETEMIR 100 [IU]/ML
INJECTION, SOLUTION SUBCUTANEOUS
Refills: 0 | Status: ACTIVE | COMMUNITY

## 2021-05-11 RX ORDER — EMPAGLIFLOZIN 10 MG/1
10 TABLET, FILM COATED ORAL
Refills: 0 | Status: ACTIVE | COMMUNITY

## 2021-05-11 RX ORDER — LINAGLIPTIN 5 MG/1
TABLET, FILM COATED ORAL
Refills: 0 | Status: DISCONTINUED | COMMUNITY
End: 2021-05-11

## 2021-05-11 NOTE — REVIEW OF SYSTEMS
[Chest Discomfort] : chest discomfort [Negative] : Heme/Lymph [SOB] : no shortness of breath [Dyspnea on exertion] : not dyspnea during exertion [Lower Ext Edema] : no extremity edema [Leg Claudication] : no intermittent leg claudication [Palpitations] : no palpitations [Orthopnea] : no orthopnea [PND] : no PND [Syncope] : no syncope

## 2021-05-11 NOTE — PHYSICAL EXAM
[Well Developed] : well developed [Well Nourished] : well nourished [No Acute Distress] : no acute distress [Normal Conjunctiva] : normal conjunctiva [Normal Venous Pressure] : normal venous pressure [No Carotid Bruit] : no carotid bruit [Normal S1, S2] : normal S1, S2 [No Murmur] : no murmur [No Rub] : no rub [No Gallop] : no gallop [Clear Lung Fields] : clear lung fields [Good Air Entry] : good air entry [No Respiratory Distress] : no respiratory distress  [Soft] : abdomen soft [Non Tender] : non-tender [No Masses/organomegaly] : no masses/organomegaly [Normal Bowel Sounds] : normal bowel sounds [Normal Gait] : normal gait [No Edema] : no edema [No Cyanosis] : no cyanosis [No Clubbing] : no clubbing [No Varicosities] : no varicosities [No Rash] : no rash [No Skin Lesions] : no skin lesions [No Focal Deficits] : no focal deficits [Moves all extremities] : moves all extremities [Normal Speech] : normal speech [Alert and Oriented] : alert and oriented [Normal memory] : normal memory

## 2021-05-11 NOTE — HISTORY OF PRESENT ILLNESS
[FreeTextEntry1] : Aishwarya is a 61-year-old female DM, htn, Hyperlipidemia, with bumps on her legs and chest pain. She has varicose veins and referred to vascular. She continues to have chest pain. She saw cardiologist in [place] in February and had a stress test at that time. She was cleared for cervical spine surgery at that time. She has been sleeping on her side. She feels it as a dull pain in shoulder and arm. Sister had MI and so did her parents.

## 2021-05-11 NOTE — HISTORY OF PRESENT ILLNESS
[FreeTextEntry1] : 60 yo female with a history of dm presents for evaluation of right lower extremity edema and pain.  pt states that she noted the swelling on Saturday.  pt states that she went to the hospital and was told that she did not have dvt.  pt states that she had noted three areas of swelling over the right lateral calf with pain.

## 2021-05-11 NOTE — DISCUSSION/SUMMARY
[FreeTextEntry1] : The patient is a 61-year-old female diabetes mellitus, hyperlipidemia, cervical radiculopathy with atypical chest pain. \par #1 CV- unremarkable stress test Feb 2021, NSAIDs for atypical chest pain\par #2 Neuro- s/p cervical spine surgery undergoing PT with side effects that may be related\par #3 DM- continue metfomin, jardiance and levimir\par #4 Htn- continue amlodipine/olmesartan\par #5 General- f/u with [facility] cardiologist, NSAIDs prn, seen by vascular and negative doppler for DVT

## 2021-05-11 NOTE — PHYSICAL EXAM
[2+] : left 2+ [Ankle Swelling (On Exam)] : present [Varicose Veins Of Lower Extremities] : present [Varicose Veins Of The Right Leg] : of the right leg [Ankle Swelling On The Right] : mild [No Rash or Lesion] : No rash or lesion [Alert] : alert [JVD] : no jugular venous distention  [] : not present [Skin Ulcer] : no ulcer [de-identified] : appears well

## 2021-05-11 NOTE — ASSESSMENT
[FreeTextEntry1] : 62 yo female with a history of dm presents for evaluation of right lower extremity edema and pain\par \par venous duplex shows venous insufficiency of the right gsv \par \par at this time would recommend compression stockings and elevation \par pt to follow up in 3 months

## 2021-05-13 ENCOUNTER — APPOINTMENT (OUTPATIENT)
Dept: VASCULAR SURGERY | Facility: CLINIC | Age: 61
End: 2021-05-13

## 2021-08-26 ENCOUNTER — APPOINTMENT (OUTPATIENT)
Dept: VASCULAR SURGERY | Facility: CLINIC | Age: 61
End: 2021-08-26

## 2022-03-15 NOTE — ED PROVIDER NOTE - CPE EDP ENMT NORM
Please notify pt of biopsy results from 3/10 on the R tricep which showed a superficial BCC, a superficial skin cancer. I would like to treat with electrodesiccation and curettage, an in-office procedure which takes less than 30 minutes. Please schedule procedure, otherwise as planned Rituxan Counseling:  I discussed with the patient the risks of Rituxan infusions. Side effects can include infusion reactions, severe drug rashes including mucocutaneous reactions, reactivation of latent hepatitis and other infections and rarely progressive multifocal leukoencephalopathy.  All of the patient's questions and concerns were addressed. normal...

## 2023-12-12 NOTE — ED ADULT TRIAGE NOTE - HEIGHT IN CM
pertinent surgical history. Social History:   Social History     Tobacco Use    Smoking status: Every Day     Packs/day: 1.00     Years: 15.00     Additional pack years: 0.00     Total pack years: 15.00     Types: Cigarettes    Smokeless tobacco: Never   Substance Use Topics    Alcohol use: Yes     Comment: occ       Family History:   History reviewed. No pertinent family history. Allergies:  Patient has no known allergies. Current Medications :      Prior to Admission medications    Medication Sig Start Date End Date Taking?  Authorizing Provider   cyclobenzaprine (FLEXERIL) 10 MG tablet TAKE ONE (1) TABLET BY MOUTH THREE TIMES DAILY AS NEEDED FOR MUSCLE SPASMS 12/12/23  Yes Jose Camacho MD   empagliflozin (JARDIANCE) 25 MG tablet Take 1 tablet by mouth daily 12/12/23  Yes Jose Camacho MD   fluticasone (FLONASE) 50 MCG/ACT nasal spray 1 spray by Each Nostril route daily 12/12/23  Yes Jose Camacho MD   glimepiride (AMARYL) 2 MG tablet TAKE ONE (1) TABLET BY MOUTH TWO TIMES DAILY 12/12/23  Yes Jose Camacho MD   lisinopril (PRINIVIL;ZESTRIL) 10 MG tablet Take 1 tablet by mouth daily 12/12/23  Yes Jose Camacho MD   loratadine (CLARITIN) 10 MG tablet Take 1 tablet by mouth daily 12/12/23  Yes Jose Camacho MD   metFORMIN (GLUCOPHAGE-XR) 500 MG extended release tablet TAKE TWO (2) TABLETS BY MOUTH TWO TIMES DAILY 12/12/23  Yes Jose Camacho MD   pantoprazole (PROTONIX) 40 MG tablet Take 1 tablet by mouth daily 12/12/23  Yes Jose Camacho MD   pioglitazone (ACTOS) 30 MG tablet Take 1 tablet by mouth daily 12/12/23  Yes Jose Camacho MD   traZODone (DESYREL) 50 MG tablet  6/28/23  Yes ProviderAdalgisa MD   VILAZODONE HCL 20 MG Tablet  7/17/23  Yes ProviderAdalgisa MD   ARISTADA 441 MG/1.6ML PRSY injection  7/28/23  Yes Provider, MD Adalgisa   busPIRone (BUSPAR) 15 MG tablet  7/17/23  Yes ProviderAdalgisa MD   tiotropium (SPIRIVA HANDIHALER) 18 MCG inhalation capsule Inhale 1 capsule 167.64
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
## 2024-04-10 NOTE — ED PROVIDER NOTE - CARE PROVIDERS DIRECT ADDRESSES
[Mother] : mother
[email or web address]

## 2024-09-06 ENCOUNTER — EMERGENCY (EMERGENCY)
Facility: HOSPITAL | Age: 64
LOS: 1 days | Discharge: HOME | End: 2024-09-06
Attending: EMERGENCY MEDICINE
Payer: COMMERCIAL

## 2024-09-06 VITALS
DIASTOLIC BLOOD PRESSURE: 93 MMHG | HEIGHT: 65 IN | HEART RATE: 75 BPM | TEMPERATURE: 98 F | OXYGEN SATURATION: 97 % | RESPIRATION RATE: 16 BRPM | WEIGHT: 169.98 LBS | SYSTOLIC BLOOD PRESSURE: 155 MMHG

## 2024-09-06 PROCEDURE — 99285 EMERGENCY DEPT VISIT HI MDM: CPT

## 2024-09-06 NOTE — ED ADULT TRIAGE NOTE - CHIEF COMPLAINT QUOTE
almost  2 weeks of left leg swelling went to pcp had us done with negative findings, placed on dieretics, now having sob and cp

## 2024-09-07 VITALS
DIASTOLIC BLOOD PRESSURE: 74 MMHG | TEMPERATURE: 98 F | RESPIRATION RATE: 16 BRPM | SYSTOLIC BLOOD PRESSURE: 130 MMHG | OXYGEN SATURATION: 100 % | HEART RATE: 85 BPM

## 2024-09-07 LAB
ALBUMIN SERPL ELPH-MCNC: 4.1 G/DL — SIGNIFICANT CHANGE UP (ref 3.3–5)
ALP SERPL-CCNC: 150 U/L — HIGH (ref 40–120)
ALT FLD-CCNC: <39 U/L — SIGNIFICANT CHANGE UP (ref 10–45)
ANION GAP SERPL CALC-SCNC: 12 MMOL/L — SIGNIFICANT CHANGE UP (ref 5–17)
APPEARANCE UR: CLEAR — SIGNIFICANT CHANGE UP
AST SERPL-CCNC: 70 U/L — HIGH (ref 10–40)
BACTERIA # UR AUTO: NEGATIVE /HPF — SIGNIFICANT CHANGE UP
BASOPHILS # BLD AUTO: 0.02 K/UL — SIGNIFICANT CHANGE UP (ref 0–0.2)
BASOPHILS NFR BLD AUTO: 0.4 % — SIGNIFICANT CHANGE UP (ref 0–2)
BILIRUB SERPL-MCNC: 0.3 MG/DL — SIGNIFICANT CHANGE UP (ref 0.2–1.2)
BILIRUB UR-MCNC: NEGATIVE — SIGNIFICANT CHANGE UP
BUN SERPL-MCNC: 22 MG/DL — SIGNIFICANT CHANGE UP (ref 7–23)
CALCIUM SERPL-MCNC: 9.5 MG/DL — SIGNIFICANT CHANGE UP (ref 8.4–10.5)
CAST: 0 /LPF — SIGNIFICANT CHANGE UP (ref 0–4)
CHLORIDE SERPL-SCNC: 105 MMOL/L — SIGNIFICANT CHANGE UP (ref 96–108)
CO2 SERPL-SCNC: 21 MMOL/L — LOW (ref 22–31)
COLOR SPEC: YELLOW — SIGNIFICANT CHANGE UP
CREAT SERPL-MCNC: 0.58 MG/DL — SIGNIFICANT CHANGE UP (ref 0.5–1.3)
DIFF PNL FLD: NEGATIVE — SIGNIFICANT CHANGE UP
EGFR: 101 ML/MIN/1.73M2 — SIGNIFICANT CHANGE UP
EOSINOPHIL # BLD AUTO: 0.16 K/UL — SIGNIFICANT CHANGE UP (ref 0–0.5)
EOSINOPHIL NFR BLD AUTO: 3.4 % — SIGNIFICANT CHANGE UP (ref 0–6)
GLUCOSE SERPL-MCNC: 127 MG/DL — HIGH (ref 70–99)
GLUCOSE UR QL: >=1000 MG/DL
HCT VFR BLD CALC: 47.1 % — HIGH (ref 34.5–45)
HGB BLD-MCNC: 14 G/DL — SIGNIFICANT CHANGE UP (ref 11.5–15.5)
IMM GRANULOCYTES NFR BLD AUTO: 0.2 % — SIGNIFICANT CHANGE UP (ref 0–0.9)
KETONES UR-MCNC: NEGATIVE MG/DL — SIGNIFICANT CHANGE UP
LEUKOCYTE ESTERASE UR-ACNC: NEGATIVE — SIGNIFICANT CHANGE UP
LYMPHOCYTES # BLD AUTO: 0.99 K/UL — LOW (ref 1–3.3)
LYMPHOCYTES # BLD AUTO: 21.1 % — SIGNIFICANT CHANGE UP (ref 13–44)
MCHC RBC-ENTMCNC: 25.2 PG — LOW (ref 27–34)
MCHC RBC-ENTMCNC: 29.7 GM/DL — LOW (ref 32–36)
MCV RBC AUTO: 84.9 FL — SIGNIFICANT CHANGE UP (ref 80–100)
MONOCYTES # BLD AUTO: 0.4 K/UL — SIGNIFICANT CHANGE UP (ref 0–0.9)
MONOCYTES NFR BLD AUTO: 8.5 % — SIGNIFICANT CHANGE UP (ref 2–14)
NEUTROPHILS # BLD AUTO: 3.11 K/UL — SIGNIFICANT CHANGE UP (ref 1.8–7.4)
NEUTROPHILS NFR BLD AUTO: 66.4 % — SIGNIFICANT CHANGE UP (ref 43–77)
NITRITE UR-MCNC: NEGATIVE — SIGNIFICANT CHANGE UP
NRBC # BLD: 0 /100 WBCS — SIGNIFICANT CHANGE UP (ref 0–0)
NT-PROBNP SERPL-SCNC: 37 PG/ML — SIGNIFICANT CHANGE UP (ref 0–300)
PH UR: 5.5 — SIGNIFICANT CHANGE UP (ref 5–8)
PLATELET # BLD AUTO: 142 K/UL — LOW (ref 150–400)
POTASSIUM SERPL-MCNC: 3.4 MMOL/L — LOW (ref 3.5–5.3)
POTASSIUM SERPL-MCNC: 6.1 MMOL/L — HIGH (ref 3.5–5.3)
POTASSIUM SERPL-MCNC: SIGNIFICANT CHANGE UP MMOL/L (ref 3.5–5.3)
POTASSIUM SERPL-SCNC: 3.4 MMOL/L — LOW (ref 3.5–5.3)
POTASSIUM SERPL-SCNC: 6.1 MMOL/L — HIGH (ref 3.5–5.3)
POTASSIUM SERPL-SCNC: SIGNIFICANT CHANGE UP MMOL/L (ref 3.5–5.3)
PROT SERPL-MCNC: 8.3 G/DL — SIGNIFICANT CHANGE UP (ref 6–8.3)
PROT UR-MCNC: SIGNIFICANT CHANGE UP MG/DL
RBC # BLD: 5.55 M/UL — HIGH (ref 3.8–5.2)
RBC # FLD: 15 % — HIGH (ref 10.3–14.5)
RBC CASTS # UR COMP ASSIST: 1 /HPF — SIGNIFICANT CHANGE UP (ref 0–4)
SODIUM SERPL-SCNC: 138 MMOL/L — SIGNIFICANT CHANGE UP (ref 135–145)
SP GR SPEC: >1.03 — HIGH (ref 1–1.03)
SQUAMOUS # UR AUTO: 0 /HPF — SIGNIFICANT CHANGE UP (ref 0–5)
TROPONIN T, HIGH SENSITIVITY RESULT: <6 NG/L — SIGNIFICANT CHANGE UP (ref 0–51)
UROBILINOGEN FLD QL: 1 MG/DL — SIGNIFICANT CHANGE UP (ref 0.2–1)
WBC # BLD: 4.69 K/UL — SIGNIFICANT CHANGE UP (ref 3.8–10.5)
WBC # FLD AUTO: 4.69 K/UL — SIGNIFICANT CHANGE UP (ref 3.8–10.5)
WBC UR QL: 1 /HPF — SIGNIFICANT CHANGE UP (ref 0–5)

## 2024-09-07 PROCEDURE — 93971 EXTREMITY STUDY: CPT

## 2024-09-07 PROCEDURE — 71046 X-RAY EXAM CHEST 2 VIEWS: CPT

## 2024-09-07 PROCEDURE — 80053 COMPREHEN METABOLIC PANEL: CPT

## 2024-09-07 PROCEDURE — 82962 GLUCOSE BLOOD TEST: CPT

## 2024-09-07 PROCEDURE — 84132 ASSAY OF SERUM POTASSIUM: CPT

## 2024-09-07 PROCEDURE — 83880 ASSAY OF NATRIURETIC PEPTIDE: CPT

## 2024-09-07 PROCEDURE — 73630 X-RAY EXAM OF FOOT: CPT

## 2024-09-07 PROCEDURE — 71046 X-RAY EXAM CHEST 2 VIEWS: CPT | Mod: 26

## 2024-09-07 PROCEDURE — 93971 EXTREMITY STUDY: CPT | Mod: 26,LT

## 2024-09-07 PROCEDURE — 81001 URINALYSIS AUTO W/SCOPE: CPT

## 2024-09-07 PROCEDURE — 85025 COMPLETE CBC W/AUTO DIFF WBC: CPT

## 2024-09-07 PROCEDURE — 73630 X-RAY EXAM OF FOOT: CPT | Mod: 26,LT

## 2024-09-07 PROCEDURE — 87086 URINE CULTURE/COLONY COUNT: CPT

## 2024-09-07 PROCEDURE — 99285 EMERGENCY DEPT VISIT HI MDM: CPT | Mod: 25

## 2024-09-07 PROCEDURE — 93005 ELECTROCARDIOGRAM TRACING: CPT | Mod: 76

## 2024-09-07 PROCEDURE — 73610 X-RAY EXAM OF ANKLE: CPT

## 2024-09-07 PROCEDURE — 73610 X-RAY EXAM OF ANKLE: CPT | Mod: 26,LT

## 2024-09-07 PROCEDURE — 84484 ASSAY OF TROPONIN QUANT: CPT

## 2024-09-07 NOTE — ED PROVIDER NOTE - INTERNATIONAL TRAVEL
Called and spoke to patient. She states her pharmacist told her the fexofenadine and Nasonex require PAs. Went to [facility] and received approval for fexofenadine. Key: [id number] - PA Case ID: [id number]  Outcome  Approved today  PA Case: [id number], Status: Approved, Coverage Starts on: 6/9/2021 12:00:00 AM, Coverage Ends on: 6/9/2022 12:00:00 AM.    And also approval for mometasone. Desi TALAVERA Case ID: [id number]  Outcome  Approved today  PA Case: [id number], Status: Approved, Coverage Starts on: 6/9/2021 12:00:00 AM, Coverage Ends on: 6/9/2022 12:00:00 AM.    Called and notified pharmacy and patient.
Patient states she knows her insurance will not cover the Magic Mouthwash. She said she has used Nystatin in the past and would be fine getting that again. I have pended this medication, but not sure of sig. Patient is requesting a large bottle.
No

## 2024-09-07 NOTE — ED PROVIDER NOTE - NSFOLLOWUPINSTRUCTIONS_ED_ALL_ED_FT
You were seen in the emergency department for swelling of your left leg.    Your x-rays were negative.    Your ultrasound was negative for blood clots.    Your blood work was otherwise normal and did not require intervention.    Your urine test was negative for signs of infection.    Please follow-up with your PCP, cardiologist for further management.     You may take 500-1000 mg acetaminophen every 6 hours, as needed for pain.  You may take 600 mg ibuprofen every 8 hours, with food, as needed for pain.    For symptom control, you may use the acronym RICE:   Rest the affected area  Ice the area intermittently, no longer than 10 minutes at a time. Do not fall asleep with ice on.  Apply light Compression to the affected area (can use an ACE wrap)  Elevate the affected area.     Follow up with your primary physician in 1-2 days. If needed call [phone] to find a primary care physician or call  [phone] to schedule an appointment with the general medicine.    1. TAKE ALL MEDICATIONS AS DIRECTED.    2. FOR PAIN OR FEVER YOU CAN TAKE IBUPROFEN (MOTRIN, ADVIL) OR ACETAMINOPHEN (TYLENOL) AS NEEDED, AS DIRECTED ON PACKAGING.  3. FOLLOW UP WITH YOUR PRIMARY DOCTOR WITHIN 5 DAYS AS DIRECTED.  4. IF YOU HAD LABS OR IMAGING DONE, YOU WERE GIVEN COPIES OF ALL LABS AND/OR IMAGING RESULTS FROM YOUR ER VISIT--PLEASE TAKE THEM WITH YOU TO YOUR FOLLOW UP APPOINTMENTS.  5. RETURN TO THE ER FOR ANY WORSENING SYMPTOMS OR CONCERNS.    ----------------------------------------------------------------------------------------------------------------     Edema  A person's legs and feet. One leg is normal and the other leg is affected by edema.  Edema is when you have too much fluid in your body or under your skin. Edema may make your legs, feet, and ankles swell. Swelling often happens in looser tissues, such as around your eyes. This is a common condition. It gets more common as you get older.    There are many possible causes of edema. These include:  Eating too much salt (sodium).  Being on your feet or sitting for a long time.  Certain medical conditions, such as:  Pregnancy.  Heart failure.  Liver disease.  Kidney disease.  Cancer.  Hot weather may make edema worse. Edema is usually painless. Your skin may look swollen or shiny.    Follow these instructions at home:  Medicines    Take over-the-counter and prescription medicines only as told by your doctor.  Your doctor may prescribe a medicine to help your body get rid of extra water (diuretic). Take this medicine if you are told to take it.  Eating and drinking    Eat a low-salt (low-sodium) diet as told by your doctor. Sometimes, eating less salt may reduce swelling.  Depending on the cause of your swelling, you may need to limit how much fluid you drink (fluid restriction).  General instructions    Raise the injured area above the level of your heart while you are sitting or lying down.  Do not sit still or stand for a long time.  Do not wear tight clothes. Do not wear garters on your upper legs.  Exercise your legs. This can help the swelling go down.  Wear compression stockings as told by your doctor. It is important that these are the right size. These should be prescribed by your doctor to prevent possible injuries.  If elastic bandages or wraps are recommended, use them as told by your doctor.    Contact your primary doctor:  Treatment is not working.  You have heart, liver, or kidney disease and have symptoms of edema.  You have sudden and unexplained weight gain.    Get help right away if:  You have shortness of breath or chest pain.  You cannot breathe when you lie down.  You have pain, redness, or warmth in the swollen areas.  You have heart, liver, or kidney disease and get edema all of a sudden.    You have a fever and your symptoms get worse all of a sudden.  These symptoms may be an emergency. Get help right away. Call 911.  Do not wait to see if the symptoms will go away.  Do not drive yourself to the hospital.    Summary  Edema is when you have too much fluid in your body or under your skin.  Edema may make your legs, feet, and ankles swell. Swelling often happens in looser tissues, such as around your eyes.  Raise the injured area above the level of your heart while you are sitting or lying down.  Follow your doctor's instructions about diet and how much fluid you can drink.

## 2024-09-07 NOTE — ED ADULT NURSE NOTE - OBJECTIVE STATEMENT
Pt is a 63y/o F arriving to [facility] ED from triage for multiple medical complaints. Pt endorsing worsening right leg swelling for the past week, was seen at PCP a week ago, US results negative, still recommend pt present to ED if symptoms worsen, has been taking lasix as home with minimal relief in symptoms, associated chest pain and SOB occurring today. PMH DM, HTN, anemia, B-12 deficiency, double mastectomy in 9/23, Upon physical assessment +3 pitting edema noted in right lower extremity, +2 pitting edema left lower extremity, pt able to ambulate without assistance, respirations equal depth, rate and rhythm. Pt is A&Ox4 currently denying any HA, visual deficits, cough, palpitations, abd pain, urinary symptoms, n/v/d/c, fever/chills. Pt ambulates independently at baseline. Bedrail's up and comfort measures provided

## 2024-09-07 NOTE — ED PROVIDER NOTE - OBJECTIVE STATEMENT
64-year-old female with history of bilateral mastectomy for concern for breast cancer, HTN, HLD, T2DM on insulin presenting to the emergency department for x 1 day of chest pain and shortness of breath in the setting of lower extremity swelling.  Patient reports she has had lower extremity swelling for 2 weeks.  She reported that her left lower extremity was more significantly swollen than her right.  Saw her PCP on 8/29 and underwent duplex that was negative for DVT at that time.  Patient has no history of DVT/PE.  Was reportedly started on 20 mg of furosemide daily with resultant mild improvement of the swelling bilaterally however in the setting of chest pain and shortness of breath today came to the ED for further evaluation.  Patient reports that she has no difficulties ambulating but the left foot and ankle are painful on the dorsal aspect.  No redness, warmth to the joint reported.  Denies fever/chills. 64-year-old female with history of bilateral mastectomy for concern for breast cancer, pernicious anemia, HTN, HLD, T2DM on insulin presenting to the emergency department for x 1 day of chest pain and shortness of breath in the setting of lower extremity swelling.  Patient reports she has had lower extremity swelling for 2 weeks.  She reported that her left lower extremity was more significantly swollen than her right.  Saw her PCP on 8/29 and underwent duplex that was negative for DVT at that time.  Patient has no history of DVT/PE.  Was reportedly started on 20 mg of furosemide daily with resultant mild improvement of the swelling bilaterally however in the setting of chest pain and shortness of breath today came to the ED for further evaluation.  Patient reports that she has no difficulties ambulating but the left foot and ankle are painful on the dorsal aspect.  No redness, warmth to the joint reported.  Denies fever/chills.

## 2024-09-07 NOTE — ED PROVIDER NOTE - CHIEF COMPLAINT
The patient is a 64y Female complaining of  The patient is a 64y Female complaining of LLE swelling w/ CP and SOB.

## 2024-09-07 NOTE — ED PROVIDER NOTE - PATIENT PORTAL LINK FT
You can access the FollowMyHealth Patient Portal offered by [facility] by registering at the following website: [email or web address]. By joining [employer]’s FollowMyHealth portal, you will also be able to view your health information using other applications (apps) compatible with our system.

## 2024-09-07 NOTE — ED PROVIDER NOTE - CLINICAL SUMMARY MEDICAL DECISION MAKING FREE TEXT BOX
64-year-old female with history of bilateral mastectomy, pernicious anemia, HTN, HLD, T2DM on insulin presented to ED for medical evaluation after left lower extremity swelling started 2 weeks ago and has progressively worsened now with foot and ankle pain on the dorsal aspect without erythema, warmth to touch or fevers.  Patient also with episode of chest pain left-sided of the chest that radiates down the left upper extremity and some mild shortness of breath.  Patient reports that shortness of breath is in the setting of pernicious anemia chronically but was acutely worsened today today, primarily exertional.  Chest pain was prior to the emergency department since resolved.  Had negative duplex on 8/29 that was negative for DVT at the left lower extremity.  Had some mild improvement on 20 mg of furosemide.  Afebrile.  Hemodynamically stable.  On exam patient is otherwise well-appearing.  Has bilateral lower extremity swelling/pitting edema with left lower extremity worse than the right.  Lungs are clear to auscultation bilaterally patient is not in any signs of respiratory distress.  Cardiovascular exam otherwise unremarkable.  Differential diagnose include but is not limited to fluid overload, DVT, aseptic edema of the left lower extremity, musculoskeletal pathology/OA, rheumatologic etiology, low concern for septic joint.  Will get screening labs, cardiac enzymes, foot and ankle x-ray, repeat duplex, chest x-ray. Dispo if work up negative likely outpatient with PCP and ortho follow up, outpatient MRI. 64-year-old female with history of bilateral mastectomy, pernicious anemia, HTN, HLD, T2DM on insulin presented to ED for medical evaluation after left lower extremity swelling started 2 weeks ago in area of L lateral ankle and has progressively worsened now with dorsal foot and lateral ankle pain without erythema, warmth to touch, dec ROM, inability to ambulate or fevers. Has had OP US on 8/29 neg for DVT. Also had Xrays neg for fracture. Patient also with episode of chest pain today, left-sided w radiation to left upper extremity and some mild assoc shortness of breath, primarily exertional and slightly worse than her baseline. Chest pain resolved prior to ED eval. Also denying SOB presently. Afebrile.  Hemodynamically stable.  On exam patient is otherwise well-appearing. No pallor. Has mild bilateral lower extremity swelling/pitting edema with left lower extremity worse than the right to below knee. L lateral ankle w swelling and mild TTP. No pain w ROM. No warmthor erythema,  Lungs are clear to auscultation bilaterally, no signs of respiratory distress.  Cardiovascular exam otherwise unremarkable.  Differential diagnose include but is not limited to fluid overload/chf, ACS, VTE, musculoskeletal pathology/OA, rheumatologic etiology, low concern for septic joint or gout given not red, hot- no fever or dec ROM.  Will get screening labs, cardiac enzymes, foot and ankle x-ray, repeat duplex, chest x-ray. If work up negative likely DC w outpatient PCP FU

## 2024-09-07 NOTE — ED PROVIDER NOTE - PHYSICAL EXAMINATION
GEN: Patient awake and alert. No acute distress, non-toxic. Well appearing.   Head: Normocephalic, atraumatic.  Neck: Nontender, full ROM.   Eyes: PERRLA b/l. EOMI, no scleral icterus, no conjunctival injection. Moist mucous membranes.  CARDIAC: RRR. Normal S1, S2. No murmur, rubs, or gallops. B/l peripheral edema pitting left is worse than right.   PULM: Speaking in full sentences. CTA B/L no wheeze, rales or rhonchi. No signs of respiratory distress, no accessory muscle usage or nasal flaring.  ABD: Soft, nontender, nondistended. No rebound, no involuntary guarding.   MSK: Moving all extremities spontaneously. Full ROM in extremities. No obvious deformity. No TTP over left ankle and foot, also without warmth to tough or erythema.   NEURO: A&Ox3, no focal neurological deficits  SKIN: Warm, dry, no rash, no lesions, no open wounds. No urticaria. No jaundice.

## 2024-09-07 NOTE — ED PROVIDER NOTE - PROGRESS NOTE DETAILS
Ghassan Gregory MD PGY2: Patient reassessed, stable. US neg, XR neg. Labs nonactionable. Patient can follow up outpatient with PCP. Requesting UA prior to DC. Will send and action off results. Patient can still be discharged after. Ghassan Gregory MD, PGY2: Patient to be discharged. UA negative. Ghassan Gregory MD PGY2: Patient reassessed, stable. US neg, XR neg. Labs nonactionable. Patient can follow up outpatient with PCP. Requesting UA prior to DC. Will send and Patient can still be discharged after.

## 2024-09-07 NOTE — ED ADULT NURSE NOTE - CAS DISCH CONDITION
Brief Operative Note    Patient: Treasure Wilcox 47 year old female    MRN: [id number]    Surgeon(s): Miguel Colvin MD  Phone Number: [phone]                       Surgeon(s) and Role:     * Miguel Colvin MD - Primary      Pre-Op Diagnosis: Cervical myelopathy (CMD) [G95.9]     Post-Op Diagnosis: Same     Procedure: Procedure(s):  ANTERIOR CERVICAL DISCECTOMY AND FUSION CERVICAL 5 - 6 WITH NEUROMONITORING    Anesthesia Type: No value filed.                                   Complications: None    Description: See full op note    Findings: C5-6 decompressed, no abnormal bleeding    Specimens Removed: No specimens collected     Estimated Blood Loss: 10 mL    Implants:   Implant Name Type Inv. Item Serial No.  Lot No. LRB No. Used Action   GRAFT OSTEOCEL PRO 5CC MED CLLR BN MTRX ALLOGRAFT BN - [id number] Tissue GRAFT OSTEOCEL PRO 5CC MED CLLR BN MTRX ALLOGRAFT BN [id number] [employer]  N/A 1 Implanted   SCREW BN 3.5MM 15MM SELF DRILL ELSA ANG NS SPINE CRV ANT LF - S. Screw SCREW BN 3.5MM 15MM SELF DRILL ELSA ANG NS SPINE CRV ANT LF . [employer]  N/A 4 Implanted   PLATE BN 24MM SPINE CRV ANT 1 LVL 1.6V NS LF - S. Plate PLATE BN 24MM SPINE CRV ANT 1 LVL 1.6V NS LF . [employer]  N/A 1 Implanted   CAGE SPNL COROENT S7 SM CONTOUR PEEK NS LF - S. Spacer Spine CAGE SPNL COROENT S7 SM CONTOUR PEEK NS LF . [employer]  N/A 1 Implanted         I was present for the key portions of the procedure and was immediately available for the non-key portions        
Stable

## 2024-09-08 LAB
CULTURE RESULTS: SIGNIFICANT CHANGE UP
SPECIMEN SOURCE: SIGNIFICANT CHANGE UP

## 2024-10-03 ENCOUNTER — INPATIENT (INPATIENT)
Facility: HOSPITAL | Age: 64
LOS: 5 days | Discharge: HOME | DRG: 379 | End: 2024-10-09
Attending: STUDENT IN AN ORGANIZED HEALTH CARE EDUCATION/TRAINING PROGRAM | Admitting: STUDENT IN AN ORGANIZED HEALTH CARE EDUCATION/TRAINING PROGRAM
Payer: COMMERCIAL

## 2024-10-03 VITALS
HEART RATE: 82 BPM | RESPIRATION RATE: 17 BRPM | SYSTOLIC BLOOD PRESSURE: 147 MMHG | WEIGHT: 169.98 LBS | OXYGEN SATURATION: 96 % | DIASTOLIC BLOOD PRESSURE: 86 MMHG | TEMPERATURE: 99 F | HEIGHT: 65 IN

## 2024-10-03 PROCEDURE — 99285 EMERGENCY DEPT VISIT HI MDM: CPT

## 2024-10-04 ENCOUNTER — RESULT REVIEW (OUTPATIENT)
Age: 64
End: 2024-10-04

## 2024-10-04 DIAGNOSIS — K92.2 GASTROINTESTINAL HEMORRHAGE, UNSPECIFIED: ICD-10-CM

## 2024-10-04 DIAGNOSIS — E11.9 TYPE 2 DIABETES MELLITUS WITHOUT COMPLICATIONS: ICD-10-CM

## 2024-10-04 DIAGNOSIS — D51.0 VITAMIN B12 DEFICIENCY ANEMIA DUE TO INTRINSIC FACTOR DEFICIENCY: ICD-10-CM

## 2024-10-04 DIAGNOSIS — Z90.710 ACQUIRED ABSENCE OF BOTH CERVIX AND UTERUS: Chronic | ICD-10-CM

## 2024-10-04 DIAGNOSIS — D64.9 ANEMIA, UNSPECIFIED: ICD-10-CM

## 2024-10-04 DIAGNOSIS — Z90.722 ACQUIRED ABSENCE OF OVARIES, BILATERAL: Chronic | ICD-10-CM

## 2024-10-04 DIAGNOSIS — I10 ESSENTIAL (PRIMARY) HYPERTENSION: ICD-10-CM

## 2024-10-04 DIAGNOSIS — E78.5 HYPERLIPIDEMIA, UNSPECIFIED: ICD-10-CM

## 2024-10-04 DIAGNOSIS — R60.0 LOCALIZED EDEMA: ICD-10-CM

## 2024-10-04 DIAGNOSIS — R06.09 OTHER FORMS OF DYSPNEA: ICD-10-CM

## 2024-10-04 DIAGNOSIS — Z29.9 ENCOUNTER FOR PROPHYLACTIC MEASURES, UNSPECIFIED: ICD-10-CM

## 2024-10-04 LAB
A1C WITH ESTIMATED AVERAGE GLUCOSE RESULT: 7.5 % — HIGH (ref 4–5.6)
ALBUMIN SERPL ELPH-MCNC: 3.5 G/DL — SIGNIFICANT CHANGE UP (ref 3.3–5)
ALBUMIN SERPL ELPH-MCNC: 3.7 G/DL — SIGNIFICANT CHANGE UP (ref 3.3–5)
ALP SERPL-CCNC: 123 U/L — HIGH (ref 40–120)
ALP SERPL-CCNC: 143 U/L — HIGH (ref 40–120)
ALT FLD-CCNC: 16 U/L — SIGNIFICANT CHANGE UP (ref 10–45)
ALT FLD-CCNC: 18 U/L — SIGNIFICANT CHANGE UP (ref 10–45)
ANION GAP SERPL CALC-SCNC: 12 MMOL/L — SIGNIFICANT CHANGE UP (ref 5–17)
ANION GAP SERPL CALC-SCNC: 15 MMOL/L — SIGNIFICANT CHANGE UP (ref 5–17)
APTT BLD: 34.6 SEC — SIGNIFICANT CHANGE UP (ref 24.5–35.6)
AST SERPL-CCNC: 16 U/L — SIGNIFICANT CHANGE UP (ref 10–40)
AST SERPL-CCNC: 17 U/L — SIGNIFICANT CHANGE UP (ref 10–40)
BASOPHILS # BLD AUTO: 0 K/UL — SIGNIFICANT CHANGE UP (ref 0–0.2)
BASOPHILS # BLD AUTO: 0.02 K/UL — SIGNIFICANT CHANGE UP (ref 0–0.2)
BASOPHILS NFR BLD AUTO: 0 % — SIGNIFICANT CHANGE UP (ref 0–2)
BASOPHILS NFR BLD AUTO: 0.4 % — SIGNIFICANT CHANGE UP (ref 0–2)
BILIRUB SERPL-MCNC: 0.2 MG/DL — SIGNIFICANT CHANGE UP (ref 0.2–1.2)
BILIRUB SERPL-MCNC: 0.4 MG/DL — SIGNIFICANT CHANGE UP (ref 0.2–1.2)
BLD GP AB SCN SERPL QL: POSITIVE — SIGNIFICANT CHANGE UP
BUN SERPL-MCNC: 23 MG/DL — SIGNIFICANT CHANGE UP (ref 7–23)
BUN SERPL-MCNC: 24 MG/DL — HIGH (ref 7–23)
CALCIUM SERPL-MCNC: 9 MG/DL — SIGNIFICANT CHANGE UP (ref 8.4–10.5)
CALCIUM SERPL-MCNC: 9.2 MG/DL — SIGNIFICANT CHANGE UP (ref 8.4–10.5)
CHLORIDE SERPL-SCNC: 105 MMOL/L — SIGNIFICANT CHANGE UP (ref 96–108)
CHLORIDE SERPL-SCNC: 107 MMOL/L — SIGNIFICANT CHANGE UP (ref 96–108)
CO2 SERPL-SCNC: 20 MMOL/L — LOW (ref 22–31)
CO2 SERPL-SCNC: 25 MMOL/L — SIGNIFICANT CHANGE UP (ref 22–31)
CREAT SERPL-MCNC: 0.54 MG/DL — SIGNIFICANT CHANGE UP (ref 0.5–1.3)
CREAT SERPL-MCNC: 0.58 MG/DL — SIGNIFICANT CHANGE UP (ref 0.5–1.3)
EGFR: 101 ML/MIN/1.73M2 — SIGNIFICANT CHANGE UP
EGFR: 103 ML/MIN/1.73M2 — SIGNIFICANT CHANGE UP
EOSINOPHIL # BLD AUTO: 0 K/UL — SIGNIFICANT CHANGE UP (ref 0–0.5)
EOSINOPHIL # BLD AUTO: 0.13 K/UL — SIGNIFICANT CHANGE UP (ref 0–0.5)
EOSINOPHIL NFR BLD AUTO: 0 % — SIGNIFICANT CHANGE UP (ref 0–6)
EOSINOPHIL NFR BLD AUTO: 2.6 % — SIGNIFICANT CHANGE UP (ref 0–6)
ESTIMATED AVERAGE GLUCOSE: 169 MG/DL — HIGH (ref 68–114)
GLUCOSE BLDC GLUCOMTR-MCNC: 100 MG/DL — HIGH (ref 70–99)
GLUCOSE BLDC GLUCOMTR-MCNC: 101 MG/DL — HIGH (ref 70–99)
GLUCOSE BLDC GLUCOMTR-MCNC: 120 MG/DL — HIGH (ref 70–99)
GLUCOSE BLDC GLUCOMTR-MCNC: 128 MG/DL — HIGH (ref 70–99)
GLUCOSE BLDC GLUCOMTR-MCNC: 164 MG/DL — HIGH (ref 70–99)
GLUCOSE SERPL-MCNC: 169 MG/DL — HIGH (ref 70–99)
GLUCOSE SERPL-MCNC: 90 MG/DL — SIGNIFICANT CHANGE UP (ref 70–99)
HCT VFR BLD CALC: 37.2 % — SIGNIFICANT CHANGE UP (ref 34.5–45)
HCT VFR BLD CALC: 38.6 % — SIGNIFICANT CHANGE UP (ref 34.5–45)
HCT VFR BLD CALC: 40.8 % — SIGNIFICANT CHANGE UP (ref 34.5–45)
HCT VFR BLD CALC: 40.9 % — SIGNIFICANT CHANGE UP (ref 34.5–45)
HGB BLD-MCNC: 11.3 G/DL — LOW (ref 11.5–15.5)
HGB BLD-MCNC: 11.4 G/DL — LOW (ref 11.5–15.5)
HGB BLD-MCNC: 11.4 G/DL — LOW (ref 11.5–15.5)
HGB BLD-MCNC: 12.2 G/DL — SIGNIFICANT CHANGE UP (ref 11.5–15.5)
IMM GRANULOCYTES NFR BLD AUTO: 0.2 % — SIGNIFICANT CHANGE UP (ref 0–0.9)
INR BLD: 1.03 RATIO — SIGNIFICANT CHANGE UP (ref 0.85–1.16)
IRON SATN MFR SERPL: 26 % — SIGNIFICANT CHANGE UP (ref 14–50)
IRON SATN MFR SERPL: 61 UG/DL — SIGNIFICANT CHANGE UP (ref 30–160)
LYMPHOCYTES # BLD AUTO: 0.97 K/UL — LOW (ref 1–3.3)
LYMPHOCYTES # BLD AUTO: 1.57 K/UL — SIGNIFICANT CHANGE UP (ref 1–3.3)
LYMPHOCYTES # BLD AUTO: 22.1 % — SIGNIFICANT CHANGE UP (ref 13–44)
LYMPHOCYTES # BLD AUTO: 31.2 % — SIGNIFICANT CHANGE UP (ref 13–44)
MANUAL SMEAR VERIFICATION: SIGNIFICANT CHANGE UP
MCHC RBC-ENTMCNC: 24.7 PG — LOW (ref 27–34)
MCHC RBC-ENTMCNC: 24.8 PG — LOW (ref 27–34)
MCHC RBC-ENTMCNC: 24.9 PG — LOW (ref 27–34)
MCHC RBC-ENTMCNC: 25.3 PG — LOW (ref 27–34)
MCHC RBC-ENTMCNC: 27.9 GM/DL — LOW (ref 32–36)
MCHC RBC-ENTMCNC: 29.5 GM/DL — LOW (ref 32–36)
MCHC RBC-ENTMCNC: 29.9 GM/DL — LOW (ref 32–36)
MCHC RBC-ENTMCNC: 30.4 GM/DL — LOW (ref 32–36)
MCV RBC AUTO: 82.1 FL — SIGNIFICANT CHANGE UP (ref 80–100)
MCV RBC AUTO: 82.9 FL — SIGNIFICANT CHANGE UP (ref 80–100)
MCV RBC AUTO: 83.7 FL — SIGNIFICANT CHANGE UP (ref 80–100)
MCV RBC AUTO: 90.7 FL — SIGNIFICANT CHANGE UP (ref 80–100)
MONOCYTES # BLD AUTO: 0.39 K/UL — SIGNIFICANT CHANGE UP (ref 0–0.9)
MONOCYTES # BLD AUTO: 0.52 K/UL — SIGNIFICANT CHANGE UP (ref 0–0.9)
MONOCYTES NFR BLD AUTO: 10.3 % — SIGNIFICANT CHANGE UP (ref 2–14)
MONOCYTES NFR BLD AUTO: 8.9 % — SIGNIFICANT CHANGE UP (ref 2–14)
NEUTROPHILS # BLD AUTO: 2.79 K/UL — SIGNIFICANT CHANGE UP (ref 1.8–7.4)
NEUTROPHILS # BLD AUTO: 3.02 K/UL — SIGNIFICANT CHANGE UP (ref 1.8–7.4)
NEUTROPHILS NFR BLD AUTO: 55.3 % — SIGNIFICANT CHANGE UP (ref 43–77)
NEUTROPHILS NFR BLD AUTO: 68.1 % — SIGNIFICANT CHANGE UP (ref 43–77)
NEUTS BAND # BLD: 0.9 % — SIGNIFICANT CHANGE UP (ref 0–8)
NRBC # BLD: 0 /100 WBCS — SIGNIFICANT CHANGE UP (ref 0–0)
NT-PROBNP SERPL-SCNC: <36 PG/ML — SIGNIFICANT CHANGE UP (ref 0–300)
PLAT MORPH BLD: ABNORMAL
PLATELET # BLD AUTO: 125 K/UL — LOW (ref 150–400)
PLATELET # BLD AUTO: 153 K/UL — SIGNIFICANT CHANGE UP (ref 150–400)
PLATELET # BLD AUTO: 166 K/UL — SIGNIFICANT CHANGE UP (ref 150–400)
PLATELET # BLD AUTO: 167 K/UL — SIGNIFICANT CHANGE UP (ref 150–400)
POTASSIUM SERPL-MCNC: 3.7 MMOL/L — SIGNIFICANT CHANGE UP (ref 3.5–5.3)
POTASSIUM SERPL-MCNC: 4.1 MMOL/L — SIGNIFICANT CHANGE UP (ref 3.5–5.3)
POTASSIUM SERPL-SCNC: 3.7 MMOL/L — SIGNIFICANT CHANGE UP (ref 3.5–5.3)
POTASSIUM SERPL-SCNC: 4.1 MMOL/L — SIGNIFICANT CHANGE UP (ref 3.5–5.3)
PROT SERPL-MCNC: 6.2 G/DL — SIGNIFICANT CHANGE UP (ref 6–8.3)
PROT SERPL-MCNC: 6.8 G/DL — SIGNIFICANT CHANGE UP (ref 6–8.3)
PROTHROM AB SERPL-ACNC: 11.8 SEC — SIGNIFICANT CHANGE UP (ref 9.9–13.4)
RBC # BLD: 4.51 M/UL — SIGNIFICANT CHANGE UP (ref 3.8–5.2)
RBC # BLD: 4.53 M/UL — SIGNIFICANT CHANGE UP (ref 3.8–5.2)
RBC # BLD: 4.61 M/UL — SIGNIFICANT CHANGE UP (ref 3.8–5.2)
RBC # BLD: 4.92 M/UL — SIGNIFICANT CHANGE UP (ref 3.8–5.2)
RBC # FLD: 15.8 % — HIGH (ref 10.3–14.5)
RBC # FLD: 16 % — HIGH (ref 10.3–14.5)
RBC BLD AUTO: SIGNIFICANT CHANGE UP
RH IG SCN BLD-IMP: POSITIVE — SIGNIFICANT CHANGE UP
SODIUM SERPL-SCNC: 142 MMOL/L — SIGNIFICANT CHANGE UP (ref 135–145)
SODIUM SERPL-SCNC: 142 MMOL/L — SIGNIFICANT CHANGE UP (ref 135–145)
TIBC SERPL-MCNC: 236 UG/DL — SIGNIFICANT CHANGE UP (ref 220–430)
TROPONIN T, HIGH SENSITIVITY RESULT: <6 NG/L — SIGNIFICANT CHANGE UP (ref 0–51)
UIBC SERPL-MCNC: 176 UG/DL — SIGNIFICANT CHANGE UP (ref 110–370)
WBC # BLD: 4.29 K/UL — SIGNIFICANT CHANGE UP (ref 3.8–10.5)
WBC # BLD: 4.37 K/UL — SIGNIFICANT CHANGE UP (ref 3.8–10.5)
WBC # BLD: 5.04 K/UL — SIGNIFICANT CHANGE UP (ref 3.8–10.5)
WBC # BLD: 5.72 K/UL — SIGNIFICANT CHANGE UP (ref 3.8–10.5)
WBC # FLD AUTO: 4.29 K/UL — SIGNIFICANT CHANGE UP (ref 3.8–10.5)
WBC # FLD AUTO: 4.37 K/UL — SIGNIFICANT CHANGE UP (ref 3.8–10.5)
WBC # FLD AUTO: 5.04 K/UL — SIGNIFICANT CHANGE UP (ref 3.8–10.5)
WBC # FLD AUTO: 5.72 K/UL — SIGNIFICANT CHANGE UP (ref 3.8–10.5)

## 2024-10-04 PROCEDURE — 93306 TTE W/DOPPLER COMPLETE: CPT | Mod: 26

## 2024-10-04 PROCEDURE — 99223 1ST HOSP IP/OBS HIGH 75: CPT

## 2024-10-04 PROCEDURE — 93971 EXTREMITY STUDY: CPT | Mod: 26,LT

## 2024-10-04 PROCEDURE — 74177 CT ABD & PELVIS W/CONTRAST: CPT | Mod: 26,MC

## 2024-10-04 PROCEDURE — 99223 1ST HOSP IP/OBS HIGH 75: CPT | Mod: GC

## 2024-10-04 PROCEDURE — 86077 PHYS BLOOD BANK SERV XMATCH: CPT

## 2024-10-04 PROCEDURE — 71045 X-RAY EXAM CHEST 1 VIEW: CPT | Mod: 26

## 2024-10-04 PROCEDURE — 93356 MYOCRD STRAIN IMG SPCKL TRCK: CPT

## 2024-10-04 RX ORDER — ALCOHOL ANTISEPTIC PADS
25 PADS, MEDICATED (EA) TOPICAL ONCE
Refills: 0 | Status: DISCONTINUED | OUTPATIENT
Start: 2024-10-04 | End: 2024-10-09

## 2024-10-04 RX ORDER — PANTOPRAZOLE SODIUM 40 MG/1
40 TABLET, DELAYED RELEASE ORAL EVERY 12 HOURS
Refills: 0 | Status: DISCONTINUED | OUTPATIENT
Start: 2024-10-04 | End: 2024-10-08

## 2024-10-04 RX ORDER — ALCOHOL ANTISEPTIC PADS
15 PADS, MEDICATED (EA) TOPICAL ONCE
Refills: 0 | Status: DISCONTINUED | OUTPATIENT
Start: 2024-10-04 | End: 2024-10-09

## 2024-10-04 RX ORDER — EMPAGLIFLOZIN 25 MG/1
1 TABLET, FILM COATED ORAL
Refills: 0 | DISCHARGE

## 2024-10-04 RX ORDER — INSULIN DEGLUDEC 100 U/ML
24 INJECTION, SOLUTION SUBCUTANEOUS
Refills: 0 | DISCHARGE

## 2024-10-04 RX ORDER — INSULIN GLARGINE 300 U/ML
16 INJECTION, SOLUTION SUBCUTANEOUS AT BEDTIME
Refills: 0 | Status: DISCONTINUED | OUTPATIENT
Start: 2024-10-04 | End: 2024-10-05

## 2024-10-04 RX ORDER — INSULIN LISPRO 100/ML
VIAL (ML) SUBCUTANEOUS AT BEDTIME
Refills: 0 | Status: DISCONTINUED | OUTPATIENT
Start: 2024-10-04 | End: 2024-10-09

## 2024-10-04 RX ORDER — INFLUENZA VIRUS VACCINE 15; 15; 15; 15 UG/.5ML; UG/.5ML; UG/.5ML; UG/.5ML
0.5 SUSPENSION INTRAMUSCULAR ONCE
Refills: 0 | Status: DISCONTINUED | OUTPATIENT
Start: 2024-10-04 | End: 2024-10-09

## 2024-10-04 RX ORDER — ATORVASTATIN CALCIUM 10 MG/1
1 TABLET, FILM COATED ORAL
Refills: 0 | DISCHARGE

## 2024-10-04 RX ORDER — INSULIN ASPART INJECTION 100 [IU]/ML
6 INJECTION, SOLUTION SUBCUTANEOUS
Refills: 0 | DISCHARGE

## 2024-10-04 RX ORDER — ACETAMINOPHEN 325 MG
650 TABLET ORAL EVERY 6 HOURS
Refills: 0 | Status: DISCONTINUED | OUTPATIENT
Start: 2024-10-04 | End: 2024-10-09

## 2024-10-04 RX ORDER — INSULIN LISPRO 100/ML
VIAL (ML) SUBCUTANEOUS
Refills: 0 | Status: DISCONTINUED | OUTPATIENT
Start: 2024-10-04 | End: 2024-10-09

## 2024-10-04 RX ORDER — SODIUM CHLORIDE IRRIG SOLUTION 0.9 %
1000 SOLUTION, IRRIGATION IRRIGATION ONCE
Refills: 0 | Status: COMPLETED | OUTPATIENT
Start: 2024-10-04 | End: 2024-10-04

## 2024-10-04 RX ORDER — LOSARTAN POTASSIUM 100 MG/1
50 TABLET, FILM COATED ORAL DAILY
Refills: 0 | Status: DISCONTINUED | OUTPATIENT
Start: 2024-10-04 | End: 2024-10-04

## 2024-10-04 RX ORDER — PANTOPRAZOLE SODIUM 40 MG/1
80 TABLET, DELAYED RELEASE ORAL ONCE
Refills: 0 | Status: COMPLETED | OUTPATIENT
Start: 2024-10-04 | End: 2024-10-04

## 2024-10-04 RX ORDER — ATORVASTATIN CALCIUM 10 MG/1
20 TABLET, FILM COATED ORAL AT BEDTIME
Refills: 0 | Status: DISCONTINUED | OUTPATIENT
Start: 2024-10-04 | End: 2024-10-09

## 2024-10-04 RX ORDER — GLUCAGON INJECTION, SOLUTION 0.5 MG/.1ML
1 INJECTION, SOLUTION SUBCUTANEOUS ONCE
Refills: 0 | Status: DISCONTINUED | OUTPATIENT
Start: 2024-10-04 | End: 2024-10-09

## 2024-10-04 RX ORDER — AMLODIPINE BESYLATE AND OLMESARTAN MEDOXOMIL 10; 20 MG/1; MG/1
1 TABLET, FILM COATED ORAL
Refills: 0 | DISCHARGE

## 2024-10-04 RX ORDER — CYANOCOBALAMIN (VITAMIN B-12) 1000MCG/ML
1 VIAL (ML) INJECTION
Refills: 0 | DISCHARGE

## 2024-10-04 RX ORDER — INSULIN LISPRO 100/ML
3 VIAL (ML) SUBCUTANEOUS
Refills: 0 | Status: DISCONTINUED | OUTPATIENT
Start: 2024-10-04 | End: 2024-10-09

## 2024-10-04 RX ADMIN — ATORVASTATIN CALCIUM 20 MILLIGRAM(S): 10 TABLET, FILM COATED ORAL at 21:38

## 2024-10-04 RX ADMIN — Medication 1000 MILLILITER(S): at 12:31

## 2024-10-04 RX ADMIN — PANTOPRAZOLE SODIUM 80 MILLIGRAM(S): 40 TABLET, DELAYED RELEASE ORAL at 09:31

## 2024-10-04 RX ADMIN — Medication 1: at 13:11

## 2024-10-04 RX ADMIN — PANTOPRAZOLE SODIUM 40 MILLIGRAM(S): 40 TABLET, DELAYED RELEASE ORAL at 17:53

## 2024-10-04 RX ADMIN — INSULIN GLARGINE 16 UNIT(S): 300 INJECTION, SOLUTION SUBCUTANEOUS at 21:39

## 2024-10-04 NOTE — H&P ADULT - PROBLEM SELECTOR PLAN 1
Intermittent episodes of bright red hematochezia  -GI following  -endo/colonoscopy planned for monday  -CT negative for source of bleeding  -1 L LR bolus given for hypotension 2/2 hematochezia Intermittent episodes of bright red hematochezia. Hemodynamic stability favors LGIB rather than UGIB. C/b RRT on 10/4/24 for large GI bleed.  - CT negative for source of bleeding  - diverticuli on CT AP, may be source of bleeding  - appreciate GI recs  - CLD for now pending EGD/colo  - s/p 1 L LR bolus given for hypotension 2/2 hematochezia  - endo/colonoscopy planned for monday  - maintain 2 large bore IVs  - protonix 40 BID

## 2024-10-04 NOTE — H&P ADULT - PROBLEM SELECTOR PLAN 4
-iron levels, ferritin, TIBC labs ordered  -monitor q12 cbc's  -follows outpatient hematologist, receives routine B12/iron infusions 1 year history of progressive dyspnea on mild exertion and LE edema, c/f possible heart failure vs. PAH vs other  - per patient report, outpatient doppler negative for DVT  - TTE  - BNP

## 2024-10-04 NOTE — H&P ADULT - NSHPLABSRESULTS_GEN_ALL_CORE
11.3   4.29  )-----------( 153      ( 04 Oct 2024 05:21 )             37.2       10-04    142  |  105  |  24[H]  ----------------------------<  90  4.1   |  25  |  0.54    Ca    9.2      04 Oct 2024 00:32    TPro  6.8  /  Alb  3.7  /  TBili  0.2  /  DBili  x   /  AST  17  /  ALT  18  /  AlkPhos  143[H]  10-04              Urinalysis Basic - ( 04 Oct 2024 00:32 )    Color: x / Appearance: x / SG: x / pH: x  Gluc: 90 mg/dL / Ketone: x  / Bili: x / Urobili: x   Blood: x / Protein: x / Nitrite: x   Leuk Esterase: x / RBC: x / WBC x   Sq Epi: x / Non Sq Epi: x / Bacteria: x        PT/INR - ( 04 Oct 2024 00:32 )   PT: 11.8 sec;   INR: 1.03 ratio         PTT - ( 04 Oct 2024 00:32 )  PTT:34.6 sec    Lactate Trend            CAPILLARY BLOOD GLUCOSE      POCT Blood Glucose.: 120 mg/dL (04 Oct 2024 10:28)        Culture Results:   >=3 organisms. Probable collection contamination. (09-07 @ 06:38)

## 2024-10-04 NOTE — CONSULT NOTE ADULT - ASSESSMENT
63 yo F with PMH of breast cancer 2023 s/p mastectomy, GI bleed 2023, pernicious anemia, HTN, HLD, and T2DM presenting for hematochezia for 3 days, found to have drop in Hgb from 14 to 11 with blood on rectal exam.     Impression:  #Hematochezia  #Hx of GI bleed 2023    P/w 3d of hematochezia with brisk rectal bleeding since last night and accompanied dizziness and fatigue. Labs notable for Hgb 12.2 on admission from  65 yo F with PMH of breast cancer 2023 s/p mastectomy, GI bleed 2023, pernicious anemia, HTN, HLD, and T2DM presenting for hematochezia for 3 days, found to have drop in Hgb from 14 to 11 with blood on rectal exam.     Impression:  #Hematochezia  #Hx of GI bleed 2023    P/w 3d of hematochezia with brisk rectal bleeding since last night and accompanied dizziness and fatigue. Labs notable for Hgb 12.2 on admission from 14 then fell to 11.3. Suspect diverticular bleed given brisk large volume intermittent episodes of hematochezia and CT findings of colonic diverticuli and prior colonoscopy 2023 showing diverticuli in hepatic flexure and ascending colon. Pt had similar presentation 10/2023 with negative workup including small bowel enteroscopy, capsule, and colonoscopy other than diverticuli. Suspect prior bleed also diverticular in origin. DDx also includes hemorrhoidal bleed, mass, or polyp. UGIB also on ddx given significant bleeding but less likely especially given nl EGD last year. Remains hemodynamically stable    Recommendations:  - Plan for colonoscopy +/- EGD Monday  - Trend cbc, transfuse Hgb>7, active T&S  - CLD Sunday prior to procedure  - Make NPO midnight day of procedure  - Will order 4 L Golytely prep for day prior to procedure. If stool not clear by 12 am, give additional 2 L prep  - Please obtain early AM labs prior to the procedure (cbc, bmp, coags, T&S)  - Hold DVT ppx night before procedure  - If hemodynamically significant bleed, obtain stat CTA    All recommendations are tentative until note is attested by attending.     Marcela Zimmer, PGY4  Gastroenterology/Hepatology Fellow  Available on Microsoft Teams  [phone] (Long Range Pager)  [phone] (Short Range Pager [facility])    After 5 pm, please contact the on-call GI fellow for any urgent issues via the Hospital Call

## 2024-10-04 NOTE — CONSULT NOTE ADULT - PROVIDER SPECIALTY LIST ADULT
"    Mellissa Concepcion is a 65 year old, presenting for the following health issues:  Procedure (Colposcopy. )        11/6/2023     4:44 PM   Additional Questions   Roomed by Afua SUN     Patient comes in for colposcopy today.  LSIL noted on last Pap smear.    She is also noted to have elevated blood pressure reading.  She tells that she has had off-and-on high blood pressure readings when she comes here.  She does not check her blood pressure at home.  No chest pain or shortness of breath.  Feels well otherwise.          Review of Systems   CONSTITUTIONAL: NEGATIVE for fever, chills, change in weight  ENT/MOUTH: NEGATIVE for ear, mouth and throat problems  RESP: NEGATIVE for significant cough or SOB  CV: NEGATIVE for chest pain, palpitations or peripheral edema      Objective    BP (!) 187/95   Pulse 70   Temp 97.4  F (36.3  C) (Temporal)   Resp 16   Ht 1.487 m (4' 10.54\")   Wt 62.3 kg (137 lb 6.4 oz)   LMP 03/24/2009   SpO2 98%   BMI 28.19 kg/m    Body mass index is 28.19 kg/m .  Physical Exam   GENERAL: healthy, alert and no distress  RESP: lungs clear to auscultation - no rales, rhonchi or wheezes  CV: regular rate and rhythm, normal S1 S2        Colposcopy Procedure Note  No obstetric history on file.  LMP: Patient's last menstrual period was 03/24/2009.  No results found for any visits on 11/06/23.  Lab Results   Component Value Date    PAP ASC-US 07/31/2020       Previous pap showed LGSIL.   Previous Colpo : YES -normal in 2020  Previous cervical treatment: no treatment.  Bleeding disorder:NO      Social History     Tobacco Use    Smoking status: Never    Smokeless tobacco: Never   Substance Use Topics    Alcohol use: No     Alcohol/week: 0.0 standard drinks of alcohol     Past Medical History:   Diagnosis Date    ASCUS with positive high risk HPV 09/2014    colposcopy benign    Chronic gastric ulcer without mention of hemorrhage, perforation, without mention of obstruction 1994    gastric " erosion dx'ed by endoscopy. pt was also treated for pos H.pylori    Disc disorder of cervical region 2006    mild disc changes C3-4/C4-5 and C5-6 with chronic myofascial cervicoscapular pain    Elevated transaminase level 2012    AST/ALT 2-3 x normal; fatty infiltration liver on ultrasound; negative testing for infectious hepatitis; normal iron levels, ELP, CK    Latent tuberculosis 10/2009    started INH     LSIL (low grade squamous intraepithelial lesion) on Pap smear     HPV 56 (high risk) detected 2010    Major depressive disorder, single episode, moderate (H) 2006    Mixed hyperlipidemia 2009    elevated TG and LDL    Osteopenia 2009    mild, femoral neck    Recurrent genital herpes 2010    Status post laminectomy 2007/2012 12/2007: L5-S1 diskectomy 2012:Revision diskectomy left at L5-S1     Past Surgical History:   Procedure Laterality Date    COLONOSCOPY N/A 07/15/2019    Procedure: COLONOSCOPY, WITH POLYPECTOMY AND BIOPSY;  Surgeon: Danyell Peters MD;  Location:  GI    CONIZATION LEEP  02/2011    Completed for recurrent Mild Dysplasia and chronic endocervicitis    DISKECTOMY, LUMBAR, SINGLE SP  12/2007    L5-S1 diskectomy    HC INJ TRANSFORAMIN EPIDURAL, CERV/THOR SINGLE  05/2006    C7-T1 epidural steroid    INJECTION, ANESTHETIC/STEROID, TRANSFORAMINAL EPIDURAL; LUMBAR/SACRAL, SINGLE LEVEL  8,9/2007    left L5 selective nerve root injection under fluoroscopy with corticosteroid.     INJECTION, ANESTHETIC/STEROID, TRANSFORAMINAL EPIDURAL; LUMBAR/SACRAL, SINGLE LEVEL  10/2007    right L5-S1 translaminar epidural injection    LAMINECTOMY LUMBAR ONE LEVEL  03/06/2012    Procedure:LAMINECTOMY LUMBAR ONE LEVEL; REVISION DISCECTOMY L5-S1 ON THE LEFT ; Surgeon:ROSE PRITCHETT; Location: OR    LAPAROSCOPIC CHOLECYSTECTOMY  07/1999    MR LUMBAR SPINE W/O CONTRAST  01/2012    Moderate-sized left posterolateral caudally extruded L5-S1disc herniation impinging on left S1 nerve, mild  Gastroenterology degenerative disc changes at L4-5    RELEASE TRIGGER FINGER  05/17/2012    Procedure:RELEASE TRIGGER FINGER; right ring finger trigger release  (latex allergy:contact); Surgeon:QUYEN FUNK; Location:[facility]    SONO ABDOMEN LIMITED  09/2012    Increased echotexture liver c/w fatty infiltration    TUBAL LIGATION  2000           Current Outpatient Medications:     baclofen (LIORESAL) 10 MG tablet, Take 0.5-1 tablets (5-10 mg) by mouth 2 times daily as needed for muscle spasms, Disp: 60 tablet, Rfl: 1    brimonidine (ALPHAGAN) 0.2 % ophthalmic solution, AFTER SURGERY: 1 DROP SURGICAL EYE(S) TWICE DAILY X 1 WEEK, Disp: , Rfl:     CALTRATE 600+D PLUS 600-400 MG-UNIT OR TABS, 1 TABLET TWICE DAILY , Disp: 3 MONTHS, Rfl: PRN    CENTRUM OR TABS, 1 TABLET DAILY, Disp: 30, Rfl: 0    cetirizine (ZYRTEC) 10 MG tablet, Take 1 tablet (10 mg) by mouth daily, Disp: 90 tablet, Rfl: 3    FLUoxetine (PROZAC) 20 MG capsule, Take 1 capsule (20 mg) by mouth daily Take it with 40mg capsule to make 60mg in total, Disp: 90 capsule, Rfl: 0    FLUoxetine (PROZAC) 40 MG capsule, TAKE 1 CAPSULE (40 MG) BY MOUTH DAILY TAKE IT WITH 20MG CAPSULE TO MAKE 60MG IN TOTAL, Disp: 90 capsule, Rfl: 0    gabapentin (NEURONTIN) 300 MG capsule, Take 300mg (one capsule) 1-2 times daily, Disp: 180 capsule, Rfl: 1    omeprazole (PRILOSEC) 40 MG DR capsule, Take 1 capsule (40 mg) by mouth daily as needed (GERD), Disp: 60 capsule, Rfl: 8    rosuvastatin (CRESTOR) 40 MG tablet, Take 1 tablet (40 mg) by mouth daily, Disp: 90 tablet, Rfl: 1    valACYclovir (VALTREX) 500 MG tablet, TAKE 1 TABLET BY MOUTH EVERY DAY, Disp: 90 tablet, Rfl: 0    VITAMIN D 1000 UNIT OR CAPS, 2 CAPSULES DAILY, Disp: 180 Cap, Rfl: PRN        Allergies   Allergen Reactions    Aspirin Rash    Latex     Nsaids GI Disturbance    Sulfasalazine Rash         Procedure Details   The risks and benefits of the procedure and Written Consent Received.  Speculum placed in vagina and excellent  visualization of cervix achieved, cervix swabbed with acetic acid solution.  Findings:  Cervix: no visible lesions, no mosaicism, no punctation, and no abnormal vasculature; endocervical curettage performed.  Specimens:   -ECC  Complications:none    1. LGSIL on Pap smear of cervix    - Surgical Pathology Exam  - COLPOSCOPY  - ENDOCERVICAL CURETTAGE    2. Elevated blood pressure reading without diagnosis of hypertension  Patient noted to have very high blood pressure today.  She is instructed to buy a blood pressure cuff for home use.  Start checking her blood pressure herself.  Follow-up in 1 week to see me in clinic.  Instructed to bring her blood pressure machine with her so I can make sure she is using it accurately.  If her blood pressure continues to be high, we will initiate a medication.  Patient is in agreement to the plan      Plan:  Specimens labelled and sent to Pathology.  Will base further treatment on Pathology findings.  Post biopsy instructions given to patient.    Erna Patrick MD  11/6/2023

## 2024-10-04 NOTE — H&P ADULT - PROBLEM SELECTOR PLAN 6
-insuline lispro sub q 3x day before meals and 1x before bed  -jardiance 10mg qd Home regimen: Tresiba 24u qHS, Novolog 6u qAC, Jardiance 10mg qd  - FS/GRETTA  - Lantus 16u qHS  - Lispro 3u qAC

## 2024-10-04 NOTE — H&P ADULT - NSICDXPASTSURGICALHX_GEN_ALL_CORE_FT
PAST SURGICAL HISTORY:  History of appendectomy     History of bilateral salpingo-oophorectomy (BSO)     History of hysterectomy

## 2024-10-04 NOTE — H&P ADULT - PROBLEM SELECTOR PROBLEM 4
CC:  Majo Rodríguez is here today for   Chief Complaint   Patient presents with   • Gyn Exam     Annual     .    Last pap: 7/22/2019 ASCUS/Positive  LEEP 8/5/2019 Bx Negative    Last 3 pap tests:   6/28/2018 N/Positive  5/18/2017- LSIL/Positive  4/5/2016- Negative/Positive    History of dysplasia: LSIL  Last mammogram: 8/21/2020    Rodriguez Reynaga DO    Deniesknown Latex allergy or symptoms of Latex sensitivity.  Medication verified, no changes      Patient would like communication of messages and results via:        Cell Phone:   Telephone Information:   Mobile [phone]     Okay to leave a message containing results? Yes    Recent PHQ 2/9 Score    PHQ 2:  Date Adult PHQ 2 Score Adult PHQ 2 Interpretation   1/25/2021 0 No further screening needed       PHQ 9:         Pernicious anemia Lower extremity edema

## 2024-10-04 NOTE — H&P ADULT - ASSESSMENT
65 y/o female with PMH of HTN, DM, HLD, pernicious anemia, and previous hospitalization 1 year ago for GI bleeding of unknown source, presenting with bright-red hematochezia and dyspnea on exertion. Admitted for w/u of upper vs lower GI bleed. Patient is currently hemodynamically stable and feeling well otherwise. Labs significant for mild anemia, hgb 11.3, unlikely to explain dyspnea on exertion sx's. CT abdomen neg for source of bleeding. PE remarkable for b/l le 1-2+ pitting edema, along with dyspnea sx's are concerning for possible heart failure in addition to GI bleed.  65 y/o female with PMH of HTN, DM, HLD, pernicious anemia, and previous hospitalization 1 year ago for GI bleeding of unknown source, presenting with bright-red hematochezia and dyspnea on exertion. Hemodynamically stable on admission. Labs significant for mild anemia, hgb 11.3, unlikely to explain dyspnea on exertion sx's. CT abdomen neg for source of bleeding. Given LE edema and MUNOZ, concern for alternate etiology to MUNOZ other than blood loss anemia, such as new onset CHF

## 2024-10-04 NOTE — H&P ADULT - PROBLEM SELECTOR PLAN 2
1 year history of dyspnea on mild exertion and LE edema, c/f possible heart failure  -echocardiogram  -BNP lab Multifactorial - acute blood loss anemia and pernicious anemia  - iron levels, ferritin, TIBC labs ordered  - monitor q12 cbc's  - follows outpatient hematologist, receives routine B12/iron infusions  - transfuse for Hgb<7  - active T&S  - blood consent in chart

## 2024-10-04 NOTE — CONSULT NOTE ADULT - ATTENDING COMMENTS
Agree with above. Patient presents with hematochezia, CT A/P negative for active bleeding or bowel inflammation. She reports prior episode of hematochezia in 2023 at [facility] where she had EGD/colonoscopy/video capsule endoscopy, bleeding scan as well as Meckel's scan which were all negative. Suspect recurrent intermittently bleeding diverticular bleeding, vs less likely Dieulafoy lesions. Would monitor H/H, transfuse as needed. If patient with multiple bouts of hematochezia again, check stat CT A/P with contrast to try to localize bleeding. Otherwise, will offer colonoscopy +/- EGD Monday after bowel prep, though explained to patient that finding stigmata or active bleeding for diverticular bleeding is usually rare as the nature of the bleeding is intermittent.

## 2024-10-04 NOTE — H&P ADULT - NSHPPHYSICALEXAM_GEN_ALL_CORE
T(C): 36.7 (10-04-24 @ 08:52), Max: 37 (10-03-24 @ 22:08)  HR: 69 (10-04-24 @ 08:52) (62 - 82)  BP: 127/75 (10-04-24 @ 08:52) (112/62 - 147/86)  RR: 16 (10-04-24 @ 08:52) (16 - 20)  SpO2: 97% (10-04-24 @ 08:52) (96% - 100%)    PHYSICAL EXAM:  GENERAL: NAD, well-groomed, well-developed  HEAD:  Atraumatic, Normocephalic  EYES: EOMI, PERRLA, conjunctiva and sclera clear, no jaundice   ENMT: No tonsillar erythema, exudates, or enlargement; Moist mucous membranes  NECK: Supple, non tender, No JVD, Normal thyroid  HEART: Regular rate and rhythm; No murmurs, rubs, or gallops. S1/S2 present  RESPIRATORY: CTA B/L, No W/R/R  ABDOMEN: Soft, Nontender, Nondistended; Bowel sounds present. No hepatosplenomegally  NEUROLOGY: A&Ox3, nonfocal, moving all extremities,  EXTREMITIES:  2+ Peripheral Pulses, No clubbing or cyanosis. 2+ pitting pretibial/ankle edema, mildly worse on left side. 5/5 muscle tone in UE/LE  SKIN: warm, dry, normal color, no rash or abnormal lesions T(C): 36.7 (10-04-24 @ 08:52), Max: 37 (10-03-24 @ 22:08)  HR: 69 (10-04-24 @ 08:52) (62 - 82)  BP: 127/75 (10-04-24 @ 08:52) (112/62 - 147/86)  RR: 16 (10-04-24 @ 08:52) (16 - 20)  SpO2: 97% (10-04-24 @ 08:52) (96% - 100%)    PHYSICAL EXAM:  GENERAL: NAD  EYES: anicteric  ENMT: no obvious lesions  HEART: Regular rate and rhythm; No murmurs, rubs, or gallops. S1/S2 present  RESPIRATORY: CTA B/L, No W/R/R  ABDOMEN: Soft, Nontender, Nondistended  NEUROLOGY: A&Ox3, nonfocal, moving all extremities,  EXTREMITIES:  22+ pitting pretibial/ankle edema, L>R  SKIN: no obvious rash or lesions

## 2024-10-04 NOTE — H&P ADULT - NSHPREVIEWOFSYSTEMS_GEN_ALL_CORE
REVIEW OF SYSTEMS:  CONSTITUTIONAL: No weakness, fevers or chills  EYES/ENT: No visual changes;  No vertigo or throat pain   NECK: No pain or stiffness  RESPIRATORY: No cough, wheezing, hemoptysis; shortness of breath with excercise  CARDIOVASCULAR: No chest pain or palpitations  GASTROINTESTINAL: Single episode of mild abdominal discomfort/nausea yesterday along with hematochezia, No abdominal or epigastric pain. No vomiting, or hematemesis; No diarrhea or constipation.   GENITOURINARY: No dysuria, frequency or hematuria  NEUROLOGICAL: No numbness or weakness  EXTREMITIES: 2+ pretibial pitting edema b/l, mildly worse on left side  SKIN: No itching, rashes REVIEW OF SYSTEMS:  CONSTITUTIONAL: No weakness, fevers or chills  EYES/ENT: No visual changes;  No vertigo or throat pain   NECK: No pain or stiffness  RESPIRATORY: No cough, wheezing, hemoptysis; +shortness of breath with exercise  CARDIOVASCULAR: No chest pain or palpitations  GASTROINTESTINAL: Single episode of mild abdominal discomfort/nausea yesterday along with hematochezia, No abdominal or epigastric pain. No vomiting, or hematemesis; No diarrhea or constipation.   GENITOURINARY: No dysuria, frequency or hematuria  NEUROLOGICAL: No numbness or weakness  EXTREMITIES: +leg swelling b/l, mildly worse on left side  SKIN: No itching, rashes

## 2024-10-04 NOTE — ED PROVIDER NOTE - ATTENDING CONTRIBUTION TO CARE
Attending MD Astudillo: I personally have seen and examined this patient.  Resident note reviewed and agree on plan of care and except where noted.  See below for details.     seen in [facility]    64F with PMH/PSH including prophylactic bilateral mastectomy with expanders in, B12 deficiency, pernicious anemia, HTN, HLD, DM on insulin, LGIB (9/2023) needing multiple blood transfusions (?etiology) presents to the ED with GI bleeding.  Reports on 9/30 and 10/1 had rectal spotting.  Reports today at around 9p developed dizzy, nausea and felt near syncopal.  Reports went to bathroom and passed large amount of bright red blood per rectum.  Reports this feels like previous episode in 9/2023 which necessitated a 1 month hospitalization at [facility] [place].  Reports abdominal bloating and LLE edema.  Denies recent trauma, fall, previous DVT.  Denies AC or NSAID use.  Denies chest pain, shortness of breath,  nausea, vomiting, diarrhea, urinary complaints. Denies sick contacts, recent travel.  Reports does follow with GI and Heme.  Denies fevers, chills.  Denies hematuria, epistaxis, gingival bleeding, easy bruising.    Exam:   General: NAD  HENT: head NCAT, airway patent  Eyes: anicteric, no conjunctival injection   Lungs: lungs CTAB with good inspiratory effort, no wheezing, no rhonchi, no rales  Cardiac: +S1S2, no obvious m/r/g  GI: abdomen soft with +BS, NT, ND, +faviola blood rectally  : no CVAT  MSK: ranging neck and extremities freely, LLE with edema compared to RLE, mild calf tenderness, no erythema or increased warmth, +palpable DPs  Neuro: moving all extremities spontaneously, nonfocal  Psych: normal mood and affect     A/P: 64F with bright red blood per rectum, concern for repeat LGIB, Ddx includes diverticular bleed, will obtain labs, CTAP GIB, will obtain US to eval for DVT LLE, will obtain T&S, will obtain screening EKG

## 2024-10-04 NOTE — RAPID RESPONSE TEAM SUMMARY - NSADDTLFINDINGSRRT_GEN_ALL_CORE
On my arrival, patient had already been placed in chair to transport out of bathroom as per primary team. Patient placed in bed, VS at that time were , BP 98/50s, HR 60s, SpO2 98% on RA. Patient endorsing dizziness, but A&Ox3, mentating at baseline.  An additional 18G IV was placed. CBC and CMP drawn, then 1L LR bolus administered. Patient consented for blood products. Patient already w/ active T&S. Patient already on PPI BID and was previously given Protonix 80 for load.  Repeat VS stable, w/ BP 100s/50s-60s.  Primary team to follow up CBC and notify GI. Recommend q4h vitals

## 2024-10-04 NOTE — H&P ADULT - PROBLEM SELECTOR PLAN 8
DVT ppx: SCDs; hold pharmacological ppx due to bleeding  Diet: CLD, advance as tolerated after EGD/colo  Dispo: pending clinical course

## 2024-10-04 NOTE — H&P ADULT - PROBLEM SELECTOR PLAN 3
1 year history of dyspnea on mild exertion and LE edema, c/f possible heart failure  -doppler negative for DVT  -echocardiogram  -BNP lab 1 year history of dyspnea on mild exertion and LE edema, c/f possible heart failure  -echocardiogram  -check BNP

## 2024-10-04 NOTE — CONSULT NOTE ADULT - SUBJECTIVE AND OBJECTIVE BOX
Chief Complaint:  rectal bleeding      HPI:  Aishwarya Duff is a 63 yo F with PMH of breast cancer 2023 s/p mastectomy, GI bleed 2023, pernicious anemia, HTN, HLD, and T2DM presenting for hematochezia for 3 days. Patient reports having small amount of rectal spotting on Monday and Tuesday. On Thursday, she began to feel dizzy, nauseous, and nearly syncopized. She then had a large episode of bright red blood per rectum with large amount of bright red blood with no stool filling the toilet. She had a second large episode later in the evening after she arrived in the ED. She endorses associated gas/bubbly pain and bloating. She denies vomiting or diarrhea. Patient denies any nsaid, antiplatelet, or AC use.     Patient reports similar episode in 2023 when she presented with severe GI bleed four days after her mastectomy. She reports having large volume hematochezia filling the floor and toilet. She was admitted to [facility] [place] for GI bleed requiring 11 transfusions. Patient reports having EGD/colonoscopy and capsule study done at that time with no source of bleeding found (reports unavailable). She follows with gastroenterologist Dr Cordero who referred her to hematology for anemia evaluation.     On admission, T 98.6, bp 147/86, HR 82, RR 17, SpO2 96%. Labs notable for Hgb 12.2 (from 14 9/7/24), MCV 82.9, wbc 4.37,     Allergies:  No Known Allergies      Home Medications:    Hospital Medications:  acetaminophen     Tablet .. 650 milliGRAM(s) Oral every 6 hours PRN  atorvastatin 20 milliGRAM(s) Oral at bedtime  dextrose 50% Injectable 25 Gram(s) IV Push once  dextrose Oral Gel 15 Gram(s) Oral once PRN  glucagon  Injectable 1 milliGRAM(s) IntraMuscular once  influenza   Vaccine 0.5 milliLiter(s) IntraMuscular once  insulin glargine Injectable (LANTUS) 16 Unit(s) SubCutaneous at bedtime  insulin lispro (ADMELOG) corrective regimen sliding scale   SubCutaneous at bedtime  insulin lispro (ADMELOG) corrective regimen sliding scale   SubCutaneous three times a day before meals  insulin lispro Injectable (ADMELOG) 3 Unit(s) SubCutaneous three times a day before meals  lactated ringers Bolus 1000 milliLiter(s) IV Bolus once  pantoprazole  Injectable 40 milliGRAM(s) IV Push every 12 hours      PMHX/PSHX:  No pertinent past medical history    HLD (hyperlipidemia)    DM (diabetes mellitus)    HTN (hypertension)    Pernicious anemia    History of GI bleed    Fibroids    Abnormal uterine bleeding (AUB)    No significant past surgical history    History of hysterectomy    History of bilateral salpingo-oophorectomy (BSO)    History of appendectomy        Family history:      Denies family history of colon cancer/polyps, stomach cancer/polyps, pancreatic cancer/masses, liver cancer/disease, ovarian cancer and endometrial cancer.    Social History:     Tob: Denies  EtOH: As above  Illicit Drugs: Denies    ROS:   General:  No wt loss, fevers, chills, night sweats, fatigue  Eyes:  Good vision, no reported pain  ENT:  No sore throat, pain, runny nose, dysphagia  CV:  No pain, palpitations, hypo/hypertension  Pulm:  No dyspnea, cough, tachypnea, wheezing  GI:  As per HPI  :  No pain, bleeding, incontinence, nocturia  Muscle:  No pain, weakness  Neuro:  No weakness, tingling, memory problems  Psych:  No fatigue, insomnia, mood problems, depression  Endocrine:  No polyuria, polydipsia, cold/heat intolerance  Heme:  No petechiae, ecchymosis, easy bruisability  Skin:  No rash, tattoos, scars, edema    PHYSICAL EXAM:   GENERAL:  No acute distress  HEENT:  Normocephalic/atraumatic, no scleral icterus  CHEST:  No accessory muscle use  HEART:  Regular rate and rhythm  ABDOMEN:  Soft, non-tender, non-distended, normoactive bowel sounds,  no masses, no hepato-splenomegaly, no signs of chronic liver disease  EXTREMITIES: No cyanosis, clubbing, or edema  SKIN:  No rash  NEURO:  Alert and oriented x 3, no asterixis    Vital Signs:  Vital Signs Last 24 Hrs  T(C): 36.9 (04 Oct 2024 12:13), Max: 37 (03 Oct 2024 22:08)  T(F): 98.4 (04 Oct 2024 12:13), Max: 98.6 (03 Oct 2024 22:08)  HR: 54 (04 Oct 2024 12:13) (54 - 82)  BP: 98/58 (04 Oct 2024 12:13) (98/58 - 147/86)  BP(mean): --  RR: 16 (04 Oct 2024 12:13) (16 - 20)  SpO2: 99% (04 Oct 2024 12:13) (96% - 100%)    Parameters below as of 04 Oct 2024 12:13  Patient On (Oxygen Delivery Method): room air      Daily Height in cm: 165.1 (03 Oct 2024 22:08)    Daily     LABS:                        11.3   4.29  )-----------( 153      ( 04 Oct 2024 05:21 )             37.2     Mean Cell Volume: 82.1 fl (10-04-24 @ 05:21)    10-04    142  |  105  |  24[H]  ----------------------------<  90  4.1   |  25  |  0.54    Ca    9.2      04 Oct 2024 00:32    TPro  6.8  /  Alb  3.7  /  TBili  0.2  /  DBili  x   /  AST  17  /  ALT  18  /  AlkPhos  143[H]  10-04    LIVER FUNCTIONS - ( 04 Oct 2024 00:32 )  Alb: 3.7 g/dL / Pro: 6.8 g/dL / ALK PHOS: 143 U/L / ALT: 18 U/L / AST: 17 U/L / GGT: x           PT/INR - ( 04 Oct 2024 00:32 )   PT: 11.8 sec;   INR: 1.03 ratio         PTT - ( 04 Oct 2024 00:32 )  PTT:34.6 sec  Urinalysis Basic - ( 04 Oct 2024 00:32 )    Color: x / Appearance: x / SG: x / pH: x  Gluc: 90 mg/dL / Ketone: x  / Bili: x / Urobili: x   Blood: x / Protein: x / Nitrite: x   Leuk Esterase: x / RBC: x / WBC x   Sq Epi: x / Non Sq Epi: x / Bacteria: x                              11.3   4.29  )-----------( 153      ( 04 Oct 2024 05:21 )             37.2                         12.2   4.37  )-----------( 166      ( 04 Oct 2024 00:32 )             40.8       Imaging:           Chief Complaint:  rectal bleeding      HPI:  Aishwarya Duff is a 63 yo F with PMH of breast cancer 2023 s/p mastectomy, GI bleed 2023, pernicious anemia, HTN, HLD, and T2DM presenting for hematochezia for 3 days. Patient reports having small amount of rectal spotting on Monday and Tuesday. On Thursday, she began to feel dizzy, nauseous, and nearly syncopized. She then had a large episode of bright red blood per rectum with large amount of bright red blood with no stool filling the toilet. She had a second large episode later in the evening after she arrived in the ED. She endorses associated gas/bubbly pain and bloating. She denies vomiting or diarrhea. Patient denies any nsaid, antiplatelet, or AC use.     Patient reports similar episode in 2023 when she presented with severe GI bleed four days after her mastectomy. She reports having large volume hematochezia filling the floor and toilet. She was admitted to [facility] [place] for GI bleed requiring 11 transfusions. Patient reports having EGD/colonoscopy and capsule study done at that time with no source of bleeding found (reports unavailable). She follows with gastroenterologist Dr Cordero who referred her to hematology for anemia evaluation.     On admission, T 98.6, bp 147/86, HR 82, RR 17, SpO2 96%. Labs notable for Hgb 12.2 (from 14 9/7/24), MCV 82.9, wbc 4.37, BUN 24, and Cr 0.54. CTAP negative for bleed. She reports ongoing dizziness and fatigue.     Allergies:  No Known Allergies      Home Medications:    Hospital Medications:  acetaminophen     Tablet .. 650 milliGRAM(s) Oral every 6 hours PRN  atorvastatin 20 milliGRAM(s) Oral at bedtime  dextrose 50% Injectable 25 Gram(s) IV Push once  dextrose Oral Gel 15 Gram(s) Oral once PRN  glucagon  Injectable 1 milliGRAM(s) IntraMuscular once  influenza   Vaccine 0.5 milliLiter(s) IntraMuscular once  insulin glargine Injectable (LANTUS) 16 Unit(s) SubCutaneous at bedtime  insulin lispro (ADMELOG) corrective regimen sliding scale   SubCutaneous at bedtime  insulin lispro (ADMELOG) corrective regimen sliding scale   SubCutaneous three times a day before meals  insulin lispro Injectable (ADMELOG) 3 Unit(s) SubCutaneous three times a day before meals  lactated ringers Bolus 1000 milliLiter(s) IV Bolus once  pantoprazole  Injectable 40 milliGRAM(s) IV Push every 12 hours      PMHX/PSHX:  No pertinent past medical history    HLD (hyperlipidemia)    DM (diabetes mellitus)    HTN (hypertension)    Pernicious anemia    History of GI bleed    Fibroids    Abnormal uterine bleeding (AUB)    No significant past surgical history    History of hysterectomy    History of bilateral salpingo-oophorectomy (BSO)    History of appendectomy        Family history:      Denies family history of colon cancer/polyps, stomach cancer/polyps, pancreatic cancer/masses, liver cancer/disease, ovarian cancer and endometrial cancer.    Social History:     Tob: Denies  EtOH: As above  Illicit Drugs: Denies    ROS:   General:  No wt loss, fevers, chills, night sweats, fatigue  Eyes:  Good vision, no reported pain  ENT:  No sore throat, pain, runny nose, dysphagia  CV:  No pain, palpitations, hypo/hypertension  Pulm:  No dyspnea, cough, tachypnea, wheezing  GI:  As per HPI  :  No pain, bleeding, incontinence, nocturia  Muscle:  No pain, weakness  Neuro:  No weakness, tingling, memory problems  Psych:  No fatigue, insomnia, mood problems, depression  Endocrine:  No polyuria, polydipsia, cold/heat intolerance  Heme:  No petechiae, ecchymosis, easy bruisability  Skin:  No rash, tattoos, scars, edema    PHYSICAL EXAM:   GENERAL:  No acute distress  HEENT:  Normocephalic/atraumatic, no scleral icterus  CHEST:  No accessory muscle use  HEART:  Regular rate and rhythm  ABDOMEN:  Soft, non-tender, non-distended, normoactive bowel sounds,  no masses, no hepato-splenomegaly, no signs of chronic liver disease  PATRICIA: dark red blood in vault, normal anal opening  EXTREMITIES: No cyanosis, clubbing, or edema  SKIN:  No rash  NEURO:  Alert and oriented x 3, no asterixis    Vital Signs:  Vital Signs Last 24 Hrs  T(C): 36.9 (04 Oct 2024 12:13), Max: 37 (03 Oct 2024 22:08)  T(F): 98.4 (04 Oct 2024 12:13), Max: 98.6 (03 Oct 2024 22:08)  HR: 54 (04 Oct 2024 12:13) (54 - 82)  BP: 98/58 (04 Oct 2024 12:13) (98/58 - 147/86)  BP(mean): --  RR: 16 (04 Oct 2024 12:13) (16 - 20)  SpO2: 99% (04 Oct 2024 12:13) (96% - 100%)    Parameters below as of 04 Oct 2024 12:13  Patient On (Oxygen Delivery Method): room air      Daily Height in cm: 165.1 (03 Oct 2024 22:08)    Daily     LABS:                        11.3   4.29  )-----------( 153      ( 04 Oct 2024 05:21 )             37.2     Mean Cell Volume: 82.1 fl (10-04-24 @ 05:21)    10-04    142  |  105  |  24[H]  ----------------------------<  90  4.1   |  25  |  0.54    Ca    9.2      04 Oct 2024 00:32    TPro  6.8  /  Alb  3.7  /  TBili  0.2  /  DBili  x   /  AST  17  /  ALT  18  /  AlkPhos  143[H]  10-04    LIVER FUNCTIONS - ( 04 Oct 2024 00:32 )  Alb: 3.7 g/dL / Pro: 6.8 g/dL / ALK PHOS: 143 U/L / ALT: 18 U/L / AST: 17 U/L / GGT: x           PT/INR - ( 04 Oct 2024 00:32 )   PT: 11.8 sec;   INR: 1.03 ratio         PTT - ( 04 Oct 2024 00:32 )  PTT:34.6 sec  Urinalysis Basic - ( 04 Oct 2024 00:32 )    Color: x / Appearance: x / SG: x / pH: x  Gluc: 90 mg/dL / Ketone: x  / Bili: x / Urobili: x   Blood: x / Protein: x / Nitrite: x   Leuk Esterase: x / RBC: x / WBC x   Sq Epi: x / Non Sq Epi: x / Bacteria: x                              11.3   4.29  )-----------( 153      ( 04 Oct 2024 05:21 )             37.2                         12.2   4.37  )-----------( 166      ( 04 Oct 2024 00:32 )             40.8       Imaging:           Chief Complaint:  rectal bleeding      HPI:  Aishwarya Duff is a 65 yo F with 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 Patient reports having small amount of rectal spotting on Monday and Tuesday. On Thursday, she began to feel dizzy, nauseous, and nearly syncopized. She then had a large episode of bright red blood per rectum with large amount of bright red blood with no stool filling the toilet. She had a second large episode later in the evening after she arrived in the ED. She endorses associated gas/bubbly pain and bloating. She denies vomiting or diarrhea. Patient denies any nsaid, antiplatelet, or AC use.     Patient reports similar episode in 10/2023 when she presented with severe GI bleed four days after her mastectomy. She reports having large volume hematochezia filling the floor and toilet. She was admitted to [facility] [place] for GI bleed requiring 11 transfusions per patient. Colonoscopy showed diverticulosis at the hepatic flexure and ascending colon with no evidence of diverticular bleeding and internal hemorrhoids. Small bowel enteroscopy and capsule study unremarkable. She follows with gastroenterologist Dr Cordero who did a nuclear scan which was also negative and referred her to hematology for anemia evaluation. She receives B12 injections for anemia.     On admission, T 98.6, bp 147/86, HR 82, RR 17, SpO2 96%. Labs notable for Hgb 12.2 (from 14 9/7/24), MCV 82.9, wbc 4.37, BUN 24, and Cr 0.54. CTAP negative for bleed and showed scattered colonic diverticuli. She reports ongoing dizziness and fatigue. She had large episode of rectal bleeding filling toilet after arrival to floor. She endorses LLQ pain accompanying bleeding episode.    Allergies:  No Known Allergies      Home Medications:    Hospital Medications:  acetaminophen     Tablet .. 650 milliGRAM(s) Oral every 6 hours PRN  atorvastatin 20 milliGRAM(s) Oral at bedtime  dextrose 50% Injectable 25 Gram(s) IV Push once  dextrose Oral Gel 15 Gram(s) Oral once PRN  glucagon  Injectable 1 milliGRAM(s) IntraMuscular once  influenza   Vaccine 0.5 milliLiter(s) IntraMuscular once  insulin glargine Injectable (LANTUS) 16 Unit(s) SubCutaneous at bedtime  insulin lispro (ADMELOG) corrective regimen sliding scale   SubCutaneous at bedtime  insulin lispro (ADMELOG) corrective regimen sliding scale   SubCutaneous three times a day before meals  insulin lispro Injectable (ADMELOG) 3 Unit(s) SubCutaneous three times a day before meals  lactated ringers Bolus 1000 milliLiter(s) IV Bolus once  pantoprazole  Injectable 40 milliGRAM(s) IV Push every 12 hours      PMHX/PSHX:  No pertinent past medical history    HLD (hyperlipidemia)    DM (diabetes mellitus)    HTN (hypertension)    Pernicious anemia    History of GI bleed    Fibroids    Abnormal uterine bleeding (AUB)    No significant past surgical history    History of hysterectomy    History of bilateral salpingo-oophorectomy (BSO)    History of appendectomy        Family history:      Denies family history of colon cancer/polyps, stomach cancer/polyps, pancreatic cancer/masses, liver cancer/disease, ovarian cancer and endometrial cancer.    Social History:     Tob: Denies  EtOH: As above  Illicit Drugs: Denies    ROS:   General:  No wt loss, fevers, chills, night sweats, fatigue  Eyes:  Good vision, no reported pain  ENT:  No sore throat, pain, runny nose, dysphagia  CV:  No pain, palpitations, hypo/hypertension  Pulm:  No dyspnea, cough, tachypnea, wheezing  GI:  As per HPI  :  No pain, bleeding, incontinence, nocturia  Muscle:  No pain, weakness  Neuro:  No weakness, tingling, memory problems  Psych:  No fatigue, insomnia, mood problems, depression  Endocrine:  No polyuria, polydipsia, cold/heat intolerance  Heme:  No petechiae, ecchymosis, easy bruisability  Skin:  No rash, tattoos, scars, edema    PHYSICAL EXAM:   GENERAL:  No acute distress  HEENT:  Normocephalic/atraumatic, no scleral icterus  CHEST:  No accessory muscle use  HEART:  Regular rate and rhythm  ABDOMEN:  Soft, non-tender, non-distended, normoactive bowel sounds,  no masses, no hepato-splenomegaly, no signs of chronic liver disease  PATRICIA: dark red blood in vault, normal anal opening  EXTREMITIES: No cyanosis, clubbing, or edema  SKIN:  No rash  NEURO:  Alert and oriented x 3, no asterixis    Vital Signs:  Vital Signs Last 24 Hrs  T(C): 36.9 (04 Oct 2024 12:13), Max: 37 (03 Oct 2024 22:08)  T(F): 98.4 (04 Oct 2024 12:13), Max: 98.6 (03 Oct 2024 22:08)  HR: 54 (04 Oct 2024 12:13) (54 - 82)  BP: 98/58 (04 Oct 2024 12:13) (98/58 - 147/86)  BP(mean): --  RR: 16 (04 Oct 2024 12:13) (16 - 20)  SpO2: 99% (04 Oct 2024 12:13) (96% - 100%)    Parameters below as of 04 Oct 2024 12:13  Patient On (Oxygen Delivery Method): room air      Daily Height in cm: 165.1 (03 Oct 2024 22:08)    Daily     LABS:                        11.3   4.29  )-----------( 153      ( 04 Oct 2024 05:21 )             37.2     Mean Cell Volume: 82.1 fl (10-04-24 @ 05:21)    10-04    142  |  105  |  24[H]  ----------------------------<  90  4.1   |  25  |  0.54    Ca    9.2      04 Oct 2024 00:32    TPro  6.8  /  Alb  3.7  /  TBili  0.2  /  DBili  x   /  AST  17  /  ALT  18  /  AlkPhos  143[H]  10-04    LIVER FUNCTIONS - ( 04 Oct 2024 00:32 )  Alb: 3.7 g/dL / Pro: 6.8 g/dL / ALK PHOS: 143 U/L / ALT: 18 U/L / AST: 17 U/L / GGT: x           PT/INR - ( 04 Oct 2024 00:32 )   PT: 11.8 sec;   INR: 1.03 ratio         PTT - ( 04 Oct 2024 00:32 )  PTT:34.6 sec  Urinalysis Basic - ( 04 Oct 2024 00:32 )    Color: x / Appearance: x / SG: x / pH: x  Gluc: 90 mg/dL / Ketone: x  / Bili: x / Urobili: x   Blood: x / Protein: x / Nitrite: x   Leuk Esterase: x / RBC: x / WBC x   Sq Epi: x / Non Sq Epi: x / Bacteria: x                              11.3   4.29  )-----------( 153      ( 04 Oct 2024 05:21 )             37.2                         12.2   4.37  )-----------( 166      ( 04 Oct 2024 00:32 )             40.8       Imaging:

## 2024-10-04 NOTE — H&P ADULT - HISTORY OF PRESENT ILLNESS
Patient is a 63 y/o female with PMH of pernicious anemia and GI bleeding of unknown source who came to the ED for evaluation following an episode of bright red blood in her stool. Patient reports that she noticed some spotting blood when wiping after stooling on Monday and Tuesday, but there was not enough blood to make her concerned. Patient reports that she began to feel generally unwell yesterday while staying at her daughters house, with a headache and some stomach discomfort, which she attributed to not eating very much throughout the day. Patient then went to go stool and saw a large amount of bright red and maroon blood in the bowl, which concerned her enough to call her doctor, who recommended that she go to the ED for evaluation. Patient reports she has been hospitalized for one episode of GI bleeding in the past following a b/l mastectomy. She received 11 transfusions and was eventually diagnosed with pernicous anemia before d/c, but the initial source of bleeding was never determined. Patient has been following up with hematologist for the past year, receiving regular iron and B12 infusions. Patient has also been experiencing exercise intolerance for the past year, which she attributes to her anemia. Patient reports that she was out of breath from walking from her car to the ED during this current visit. Patient also reports b/l extremity swelling that has progressivly worsened over the past year Patient is a 65 y/o female with PMH of pernicious anemia and GI bleeding of unknown source who came to the ED for evaluation following an episode of bright red blood in her stool. Patient reports that she noticed some spotting blood when wiping after stooling on Monday and Tuesday, but there was not enough blood to make her concerned. Patient reports that she began to feel generally unwell yesterday while staying at her daughters house, with a headache and some stomach discomfort/mild [place], which she attributed to not eating very much throughout the day. Patient then went to go stool and saw a large amount of bright red and maroon blood in the bowl, which concerned her enough to call her doctor, who recommended that she go to the ED for evaluation. Patient reports she has been hospitalized for one episode of GI bleeding in the past following a b/l mastectomy. She received 11 transfusions and was eventually diagnosed with pernicous anemia before d/c, but the initial source of bleeding was never determined. Patient has been following up with hematologist for the past year, receiving regular iron and B12 infusions. Patient has also been experiencing exercise intolerance for the past year, which she attributes to her anemia. Patient reports that she was out of breath from walking from her car to the ED during this current visit. Patient also reports b/l extremity swelling that has progressivly worsened over the past year, worse in the left leg. Patient went to the ED for leg swelling last month and was discharged on lasix, doppler/lab tests were negative for DVT concerns. Patient reports the lasix helped the swelling mildly but it has still persisted. Patient denies any vomiting, chest pain, no current leg pain or abdominal pain, and no fevers.  65 y/o female with PMH of HTN, DM, HLD, pernicious anemia, and previous hospitalization 1 year ago for GI bleeding of unknown source who came to the ED for evaluation following an episode of bright red blood in her stool. Patient reports that she noticed some spotting blood when wiping after stooling on Monday and Tuesday, but there was not enough blood to make her concerned. Patient reports that she began to feel generally unwell yesterday while staying at her daughters house, with a headache and some stomach discomfort/mild [place], which she attributed to not eating very much throughout the day. Patient then went to go stool and saw a large amount of bright red and maroon blood in the bowl, which concerned her enough to call her doctor, who recommended that she go to the ED for evaluation.   Patient reports she has been hospitalized for one episode of GI bleeding in the past following a b/l mastectomy. She received 11 transfusions and was eventually diagnosed with pernicous anemia before d/c, but the initial source of bleeding was never determined. Patient has been following up with hematologist for the past year, receiving regular iron and B12 infusions.  Patient has also been experiencing exercise intolerance for the past year, which she attributes to her anemia. Patient reports that she was out of breath from walking from her car to the ED during this current visit. Patient also reports b/l extremity swelling that has progressively worsened over the past year, worse in the left leg. Patient went to the ED for leg swelling last month and was discharged on lasix, doppler/lab tests were negative for DVT concerns. Patient reports the lasix helped the swelling mildly but it has still persisted. Patient denies any vomiting, chest pain, no current leg pain or abdominal pain, and no fevers.

## 2024-10-04 NOTE — H&P ADULT - ATTENDING COMMENTS
64F with prior life-threatening GI bleed with unknown source despite exhaustive testing (per patient had EGD, capsule, colonoscopy, tagged RBC scan) here with hematochezia. She feels symptoms typically associated with anemia with worsening exercise tolerance and feeling generally weak, however, her hgb on admission was normal. I doubt her symptoms are from anemia.     In the AM, RRT called for presyncope associated with hematochezia. Repeat hgb stable but patient was relatively hypotensive at the time.     Exam notable for bilateral pitting edema - on chart review, pt was recently prescribed lasix.     CTAP shows colonic diverticulosis, but no active GI bleeding. Possibly diverticular bleed.     - GI consulted for endoscopic evaluation; plan for Monday  - Maintain active type and screen  - CBC q12  - hold amlodipine  - TTE to evaluate for heart failure      The necessity of the time spent during the encounter on this date of service was due to:   - Ordering, reviewing, and interpreting labs, testing, and imaging  - Independently obtaining a review of systems and performing a physical exam  - Reviewing prior hospitalization and where necessary, outpatient records  - Reviewing consultant recommendations/communicating with consultants  - Counselling and educating patient and family regarding interpretation of aforementioned items and plan of care    Time-based billing (NON-critical care). Total minutes spent: 90 64F with prior life-threatening GI bleed with unknown source despite exhaustive testing (per patient had EGD, capsule, colonoscopy, tagged RBC scan) here with hematochezia. She feels symptoms typically associated with anemia with worsening exercise tolerance and feeling generally weak, however, her hgb on admission was normal. I doubt her symptoms are from anemia.     In the AM, RRT called for presyncope associated with hematochezia. Repeat hgb stable but patient was relatively hypotensive at the time.     Exam notable for bilateral pitting edema - on chart review, pt was recently prescribed lasix.     CTAP shows colonic diverticulosis, but no active GI bleeding. Diverticulosis.      - Suspect LGIB, possibly diverticular in nature  - GI consulted for endoscopic evaluation; plan for Monday  - Maintain active type and screen  - CBC q12  - hold home BP meds  - TTE to evaluate for heart failure    The necessity of the time spent during the encounter on this date of service was due to:   - Ordering, reviewing, and interpreting labs, testing, and imaging  - Independently obtaining a review of systems and performing a physical exam  - Reviewing prior hospitalization and where necessary, outpatient records  - Reviewing consultant recommendations/communicating with consultants  - Counselling and educating patient and family regarding interpretation of aforementioned items and plan of care    Time-based billing (NON-critical care). Total minutes spent: 90

## 2024-10-04 NOTE — H&P ADULT - NSICDXPASTMEDICALHX_GEN_ALL_CORE_FT
PAST MEDICAL HISTORY:  Abnormal uterine bleeding (AUB)     DM (diabetes mellitus)     Fibroids     History of GI bleed     HLD (hyperlipidemia)     HTN (hypertension)     Pernicious anemia

## 2024-10-04 NOTE — H&P ADULT - PROBLEM SELECTOR PLAN 5
-hold amlodipine/valsarten iso hypotension/hypovolemia Home regimen: amlodipine-olmesartan 5-20mg qd  -hold amlodipine/losartan (therapeutic interchange) iso hypotension/hypovolemia

## 2024-10-04 NOTE — ED PROVIDER NOTE - CLINICAL SUMMARY MEDICAL DECISION MAKING FREE TEXT BOX
64-year-old female with history of bilateral mastectomy for concern for breast cancer, pernicious anemia, HTN, HLD, T2DM on insulin, hospitalization in September for 1 month requiring multiple blood transfusions for a lower GI bleed of unknown etiology, presents today for lower GI bleeding    Physical examination remarkable for left lower leg swelling nonpitting    Differential diagnosis includes lower GI bleeding, anemia of acute blood loss, active hemorrhage, colitis including ischemic    Plan includes labs and imaging for the above, type and screen as necessary for possible blood transfusion given patient's significant history, symptomatic management as necessary, and reassess 64-year-old female with history of bilateral mastectomy for concern for breast cancer, pernicious anemia, HTN, HLD, T2DM on insulin, hospitalization in late September 2023 for 1 month requiring multiple blood transfusions for a lower GI bleed of unknown etiology, presents today for lower GI bleeding    Physical examination remarkable for left lower leg swelling nonpitting    Differential diagnosis includes lower GI bleeding, anemia of acute blood loss, active hemorrhage, colitis including ischemic    Plan includes labs and imaging for the above, type and screen as necessary for possible blood transfusion given patient's significant history, symptomatic management as necessary, and reassess

## 2024-10-04 NOTE — RAPID RESPONSE TEAM SUMMARY - NSSITUATIONBACKGROUNDRRT_GEN_ALL_CORE
64F pmhx HTN, DM, HLD, hospitalization for GIB in 9/2023 without an identified source, pernicious anemia presented for BRBPR. RRT called for large bloody BM and patient dizzy in bathroom.

## 2024-10-04 NOTE — ED ADULT NURSE NOTE - OBJECTIVE STATEMENT
65 yo female PMH DM , HTN, HLD, anemia, A&ox3, presents to ED c/o rectal bleeding.  Pt reports having bright red blood in stool, felt dizzy and nauseous, and was recently hospitalized for lower GI bleeding. Breathing even and unlabored, abdomen soft nontender, no pedal edema. Pt denies chest pain, palpitations, shortness of breath, headache, visual disturbances, numbness/tingling, fever, chills, diaphoresis,  nausea, vomiting, constipation, diarrhea, or urinary symptoms. 20G rightAC.

## 2024-10-04 NOTE — ED PROVIDER NOTE - PHYSICAL EXAMINATION
Yemi Garcia DO (PGY1)   Physical Exam:    Gen: NAD, AOx3  Head: NCAT  HEENT: EOMI, PEERLA, pink and moist mucous membranes  Lung: CTAB, no respiratory distress, no wheezes/rhonchi/rales B/L  CV: RRR, no murmurs, rubs or gallops  Abd: soft, NT, ND, no guarding, no rigidity, no rebound tenderness, no CVA tenderness   MSK: no visible deformities, ROM normal in UE/LE, no back pain  Neuro: No focal sensory or motor deficits. Sensation intact to light touch all extremities.  Skin: Warm, well perfused, no rash   extremity: Left lower leg predominant around the ankle migrating up to the medial tibia fibular area has an area of firmness  Psych: normal affect, calm

## 2024-10-04 NOTE — ED PROVIDER NOTE - OBJECTIVE STATEMENT
64-year-old female with history of bilateral mastectomy for concern for breast cancer, pernicious anemia, HTN, HLD, T2DM on insulin, hospitalization in September for 1 month requiring multiple blood transfusions for a lower GI bleed of unknown etiology, presents today for lower GI bleeding.  Notes that on Monday and Tuesday she had a light rectal spotting but it was not at enough that she is concerned enough to go to the hospital yet.  She did not have any bleeding on Wednesday.  Today, at about 9 PM she felt dizzy, nauseous, enough that she felt like she was almost about to pass out, and when she went to the toilet she passed a large amount of bright red blood.  She notes that these feelings are exactly the same as what led to her previous hospitalization.  She notes that she is followed by hematology and gastroenterology for her lower GI bleeding.  Endorses a bloating sensation in her belly and of left unilateral leg swelling.  She denies current lightheadedness, dizziness, chest pain, shortness of breath, abdominal pains, dysuria.  Denies being on any blood thinners, does not take over-the-counter medications such as Tylenol or NSAIDs 64-year-old female with history of bilateral mastectomy for concern for breast cancer, pernicious anemia, HTN, HLD, T2DM on insulin, hospitalization in September for 1 month requiring multiple blood transfusions for a lower GI bleed of unknown etiology, presents today for lower GI bleeding.  Notes that on Monday and Tuesday she had a light rectal spotting but it was not at enough that she is concerned enough to go to the hospital yet.  She did not have any bleeding on Wednesday.  Today, at about 9 PM she felt dizzy, nauseous, enough that she felt like she was almost about to pass out, and when she went to the toilet she passed a large amount of bright red blood.  She notes that these feelings are exactly the same as what led to her previous hospitalization.  She notes that she is followed by hematology (Dr. Daisy Barfield) and gastroenterology (Dr. Regis Wright) for her lower GI bleeding.  Endorses a bloating sensation in her belly and of left unilateral leg swelling.  She denies current lightheadedness, dizziness, chest pain, shortness of breath, abdominal pains, dysuria.  Denies being on any blood thinners, does not take over-the-counter medications such as Tylenol or NSAIDs 64-year-old female with history of bilateral mastectomy for concern for breast cancer, pernicious anemia, HTN, HLD, T2DM on insulin, hospitalization in late September 2023  for 1 month requiring multiple blood transfusions for a lower GI bleed of unknown etiology, presents today for lower GI bleeding.  Notes that on Monday and Tuesday she had a light rectal spotting but it was not at enough that she is concerned enough to go to the hospital yet.  She did not have any bleeding on Wednesday.  Today, at about 9 PM she felt dizzy, nauseous, enough that she felt like she was almost about to pass out, and when she went to the toilet she passed a large amount of bright red blood.  She notes that these feelings are exactly the same as what led to her previous hospitalization.  She notes that she is followed by hematology (Dr. Daisy Barfield) and gastroenterology (Dr. Regis Wright) for her lower GI bleeding.  Endorses a bloating sensation in her belly and of left unilateral leg swelling.  She denies current lightheadedness, dizziness, chest pain, shortness of breath, abdominal pains, dysuria.  Denies being on any blood thinners, does not take over-the-counter medications such as Tylenol or NSAIDs 64-year-old female with history of bilateral mastectomy for concern for breast cancer, pernicious anemia, HTN, HLD, T2DM on insulin, hospitalization in late September 2023  for 1 month requiring multiple blood transfusions for a lower GI bleed of unknown etiology, presents today for lower GI bleeding.  Notes that on Monday and Tuesday she had a light rectal spotting but it was not at enough that she is concerned enough to go to the hospital yet.  She did not have any bleeding on Wednesday.  Today, at about 9 PM she felt dizzy, nauseous, enough that she felt like she was almost about to pass out, and when she went to the toilet she passed a large amount of bright red blood.  She notes that these feelings are exactly the same as what led to her previous hospitalization.  She notes that she is followed by hematology (Dr. Daisy Barfield) and gastroenterology (Dr. Regis Wright) for her lower GI bleeding.  Endorses a bloating sensation in her belly and of left unilateral leg swelling.  She denies current lightheadedness, dizziness, chest pain, shortness of breath, abdominal pains, dysuria.  Denies being on any blood thinners, does not take over-the-counter medications such as Tylenol or NSAIDs. PCP is Dr. Filomena Lombardi

## 2024-10-04 NOTE — PATIENT PROFILE ADULT - NSPRONUTRITIONRISK_GEN_A_NUR
Please call patient to schedule a consult with Dr. Salazar in [facility] for permanent access placement. Referral already in epic, and also faxed medical records received from [facility] to [facility].    No indicators present

## 2024-10-05 LAB
A1C WITH ESTIMATED AVERAGE GLUCOSE RESULT: 7.8 % — HIGH (ref 4–5.6)
ALBUMIN SERPL ELPH-MCNC: 3.3 G/DL — SIGNIFICANT CHANGE UP (ref 3.3–5)
ALP SERPL-CCNC: 107 U/L — SIGNIFICANT CHANGE UP (ref 40–120)
ALT FLD-CCNC: 15 U/L — SIGNIFICANT CHANGE UP (ref 10–45)
ANION GAP SERPL CALC-SCNC: 9 MMOL/L — SIGNIFICANT CHANGE UP (ref 5–17)
AST SERPL-CCNC: 15 U/L — SIGNIFICANT CHANGE UP (ref 10–40)
BILIRUB SERPL-MCNC: 0.3 MG/DL — SIGNIFICANT CHANGE UP (ref 0.2–1.2)
BUN SERPL-MCNC: 14 MG/DL — SIGNIFICANT CHANGE UP (ref 7–23)
CALCIUM SERPL-MCNC: 8.9 MG/DL — SIGNIFICANT CHANGE UP (ref 8.4–10.5)
CHLORIDE SERPL-SCNC: 107 MMOL/L — SIGNIFICANT CHANGE UP (ref 96–108)
CO2 SERPL-SCNC: 25 MMOL/L — SIGNIFICANT CHANGE UP (ref 22–31)
CREAT SERPL-MCNC: 0.63 MG/DL — SIGNIFICANT CHANGE UP (ref 0.5–1.3)
EGFR: 99 ML/MIN/1.73M2 — SIGNIFICANT CHANGE UP
ESTIMATED AVERAGE GLUCOSE: 177 MG/DL — HIGH (ref 68–114)
GLUCOSE BLDC GLUCOMTR-MCNC: 116 MG/DL — HIGH (ref 70–99)
GLUCOSE BLDC GLUCOMTR-MCNC: 153 MG/DL — HIGH (ref 70–99)
GLUCOSE BLDC GLUCOMTR-MCNC: 181 MG/DL — HIGH (ref 70–99)
GLUCOSE BLDC GLUCOMTR-MCNC: 87 MG/DL — SIGNIFICANT CHANGE UP (ref 70–99)
GLUCOSE SERPL-MCNC: 119 MG/DL — HIGH (ref 70–99)
HCT VFR BLD CALC: 32.8 % — LOW (ref 34.5–45)
HCT VFR BLD CALC: 33.7 % — LOW (ref 34.5–45)
HGB BLD-MCNC: 10.1 G/DL — LOW (ref 11.5–15.5)
HGB BLD-MCNC: 9.9 G/DL — LOW (ref 11.5–15.5)
MAGNESIUM SERPL-MCNC: 2 MG/DL — SIGNIFICANT CHANGE UP (ref 1.6–2.6)
MCHC RBC-ENTMCNC: 25.1 PG — LOW (ref 27–34)
MCHC RBC-ENTMCNC: 25.1 PG — LOW (ref 27–34)
MCHC RBC-ENTMCNC: 30 GM/DL — LOW (ref 32–36)
MCHC RBC-ENTMCNC: 30.2 GM/DL — LOW (ref 32–36)
MCV RBC AUTO: 83.2 FL — SIGNIFICANT CHANGE UP (ref 80–100)
MCV RBC AUTO: 83.6 FL — SIGNIFICANT CHANGE UP (ref 80–100)
NRBC # BLD: 0 /100 WBCS — SIGNIFICANT CHANGE UP (ref 0–0)
NRBC # BLD: 0 /100 WBCS — SIGNIFICANT CHANGE UP (ref 0–0)
PHOSPHATE SERPL-MCNC: 3.1 MG/DL — SIGNIFICANT CHANGE UP (ref 2.5–4.5)
PLATELET # BLD AUTO: 143 K/UL — LOW (ref 150–400)
PLATELET # BLD AUTO: 162 K/UL — SIGNIFICANT CHANGE UP (ref 150–400)
POTASSIUM SERPL-MCNC: 4.5 MMOL/L — SIGNIFICANT CHANGE UP (ref 3.5–5.3)
POTASSIUM SERPL-SCNC: 4.5 MMOL/L — SIGNIFICANT CHANGE UP (ref 3.5–5.3)
PROT SERPL-MCNC: 5.9 G/DL — LOW (ref 6–8.3)
RBC # BLD: 3.94 M/UL — SIGNIFICANT CHANGE UP (ref 3.8–5.2)
RBC # BLD: 4.03 M/UL — SIGNIFICANT CHANGE UP (ref 3.8–5.2)
RBC # FLD: 15.5 % — HIGH (ref 10.3–14.5)
RBC # FLD: 15.8 % — HIGH (ref 10.3–14.5)
SODIUM SERPL-SCNC: 141 MMOL/L — SIGNIFICANT CHANGE UP (ref 135–145)
WBC # BLD: 3.87 K/UL — SIGNIFICANT CHANGE UP (ref 3.8–10.5)
WBC # BLD: 3.97 K/UL — SIGNIFICANT CHANGE UP (ref 3.8–10.5)
WBC # FLD AUTO: 3.87 K/UL — SIGNIFICANT CHANGE UP (ref 3.8–10.5)
WBC # FLD AUTO: 3.97 K/UL — SIGNIFICANT CHANGE UP (ref 3.8–10.5)

## 2024-10-05 PROCEDURE — 99233 SBSQ HOSP IP/OBS HIGH 50: CPT

## 2024-10-05 RX ORDER — INSULIN GLARGINE 300 U/ML
8 INJECTION, SOLUTION SUBCUTANEOUS AT BEDTIME
Refills: 0 | Status: DISCONTINUED | OUTPATIENT
Start: 2024-10-05 | End: 2024-10-09

## 2024-10-05 RX ADMIN — Medication 3 UNIT(S): at 08:59

## 2024-10-05 RX ADMIN — Medication 3 UNIT(S): at 17:41

## 2024-10-05 RX ADMIN — PANTOPRAZOLE SODIUM 40 MILLIGRAM(S): 40 TABLET, DELAYED RELEASE ORAL at 17:10

## 2024-10-05 RX ADMIN — ATORVASTATIN CALCIUM 20 MILLIGRAM(S): 10 TABLET, FILM COATED ORAL at 22:13

## 2024-10-05 RX ADMIN — Medication 1: at 17:40

## 2024-10-05 RX ADMIN — Medication 1: at 08:57

## 2024-10-05 RX ADMIN — PANTOPRAZOLE SODIUM 40 MILLIGRAM(S): 40 TABLET, DELAYED RELEASE ORAL at 06:11

## 2024-10-05 RX ADMIN — INSULIN GLARGINE 8 UNIT(S): 300 INJECTION, SOLUTION SUBCUTANEOUS at 22:41

## 2024-10-05 RX ADMIN — Medication 3 UNIT(S): at 13:25

## 2024-10-05 NOTE — PROGRESS NOTE ADULT - ASSESSMENT
65 y/o female with PMH of HTN, DM, HLD, pernicious anemia, and previous hospitalization 1 year ago for GI bleeding of unknown source, presenting with bright-red hematochezia and dyspnea on exertion. Hemodynamically stable on admission. Labs significant for mild anemia, hgb 11.3, unlikely to explain dyspnea on exertion sx's. CT abdomen neg for source of bleeding. Given LE edema and MUNOZ, concern for alternate etiology to MUNOZ other than blood loss anemia, such as new onset CHF

## 2024-10-05 NOTE — PROGRESS NOTE ADULT - PROBLEM SELECTOR PLAN 3
1 year history of dyspnea on mild exertion and LE edema, c/f possible heart failure  -echocardiogram  -check BNP 1 year history of dyspnea on mild exertion and LE edema, c/f possible heart failure  -echocardiogram with EF 71%, no diastolic dysfunction  -check BNP

## 2024-10-05 NOTE — PROGRESS NOTE ADULT - PROBLEM SELECTOR PLAN 2
Multifactorial - acute blood loss anemia and pernicious anemia  - iron levels, ferritin, TIBC labs ordered  - monitor q12 cbc's  - follows outpatient hematologist, receives routine B12/iron infusions  - transfuse for Hgb<7  - active T&S  - blood consent in chart

## 2024-10-05 NOTE — PROGRESS NOTE ADULT - PROBLEM SELECTOR PLAN 4
1 year history of progressive dyspnea on mild exertion and LE edema, c/f possible heart failure vs. PAH vs other  - per patient report, outpatient doppler negative for DVT  - TTE  - BNP 1 year history of progressive dyspnea on mild exertion and LE edema, c/f possible heart failure vs. PAH vs other  - per patient report, outpatient doppler negative for DVT  - TTE as above  - BNP

## 2024-10-05 NOTE — PROGRESS NOTE ADULT - PROBLEM SELECTOR PLAN 1
Intermittent episodes of bright red hematochezia. Hemodynamic stability favors LGIB rather than UGIB. C/b RRT on 10/4/24 for large GI bleed.  - CT negative for source of bleeding  - diverticuli on CT AP, may be source of bleeding  - appreciate GI recs  - CLD for now pending EGD/colo  - s/p 1 L LR bolus given for hypotension 2/2 hematochezia  - endo/colonoscopy planned for monday  - maintain 2 large bore IVs  - protonix 40 BID Intermittent episodes of bright red hematochezia. Hemodynamic stability favors LGIB rather than UGIB. C/b RRT on 10/4/24 for large GI bleed.  - CT negative for source of bleeding  - diverticuli on CT AP, may be source of bleeding  - appreciate GI recs  - CLD for now pending EGD/colo  - endo/colonoscopy planned for monday  - maintain 2 large bore IVs  - protonix 40 BID  - if patient has large hemodynamically significant bleed, can consider CT angio abd pelvis to localize bleed, consider IR embo

## 2024-10-05 NOTE — PROGRESS NOTE ADULT - SUBJECTIVE AND OBJECTIVE BOX
DATE OF SERVICE: 10-05-24 @ 07:19    Patient is a 64y old  Female who presents with a chief complaint of GI Bleeding (04 Oct 2024 09:00)      INTERVAL/OVERNIGHT:    SUBJECTIVE:    MEDICATIONS  (STANDING):  atorvastatin 20 milliGRAM(s) Oral at bedtime  dextrose 50% Injectable 25 Gram(s) IV Push once  glucagon  Injectable 1 milliGRAM(s) IntraMuscular once  influenza   Vaccine 0.5 milliLiter(s) IntraMuscular once  insulin glargine Injectable (LANTUS) 16 Unit(s) SubCutaneous at bedtime  insulin lispro (ADMELOG) corrective regimen sliding scale   SubCutaneous three times a day before meals  insulin lispro (ADMELOG) corrective regimen sliding scale   SubCutaneous at bedtime  insulin lispro Injectable (ADMELOG) 3 Unit(s) SubCutaneous three times a day before meals  pantoprazole  Injectable 40 milliGRAM(s) IV Push every 12 hours    MEDICATIONS  (PRN):  acetaminophen     Tablet .. 650 milliGRAM(s) Oral every 6 hours PRN Temp greater or equal to 38C (100.4F), Mild Pain (1 - 3)  dextrose Oral Gel 15 Gram(s) Oral once PRN Blood Glucose LESS THAN 70 milliGRAM(s)/deciliter      Vital Signs Last 24 Hrs  T(C): 36.4 (05 Oct 2024 04:58), Max: 36.9 (04 Oct 2024 12:13)  T(F): 97.6 (05 Oct 2024 04:58), Max: 98.4 (04 Oct 2024 12:13)  HR: 67 (05 Oct 2024 04:58) (54 - 84)  BP: 108/60 (05 Oct 2024 04:58) (98/58 - 130/57)  BP(mean): --  RR: 16 (05 Oct 2024 04:58) (16 - 20)  SpO2: 97% (05 Oct 2024 04:58) (97% - 100%)    Parameters below as of 05 Oct 2024 04:58  Patient On (Oxygen Delivery Method): room air      CAPILLARY BLOOD GLUCOSE      POCT Blood Glucose.: 128 mg/dL (04 Oct 2024 21:23)  POCT Blood Glucose.: 100 mg/dL (04 Oct 2024 17:15)  POCT Blood Glucose.: 164 mg/dL (04 Oct 2024 12:14)  POCT Blood Glucose.: 120 mg/dL (04 Oct 2024 10:28)  POCT Blood Glucose.: 101 mg/dL (04 Oct 2024 07:46)    I&O's Summary    04 Oct 2024 07:01  -  05 Oct 2024 07:00  --------------------------------------------------------  IN: 50 mL / OUT: 0 mL / NET: 50 mL        PHYSICAL EXAM:  GENERAL: NAD,  HEAD:  Atraumatic, Normocephalic  EYES: anicteric  NECK: Supple, No JVD  CHEST/LUNG: Clear to auscultation bilaterally; No wheeze  HEART: Regular rate and rhythm; No murmurs, rubs, or gallops  ABDOMEN: Soft, Nontender, Nondistended  EXTREMITIES: No b/l LE edema  NEUROLOGY: A&Ox3, non-focal  SKIN: No rashes or lesions    LABS:                        11.4   5.72  )-----------( 125      ( 04 Oct 2024 17:32 )             40.9     10-04    142  |  107  |  23  ----------------------------<  169[H]  3.7   |  20[L]  |  0.58    Ca    9.0      04 Oct 2024 13:19    TPro  6.2  /  Alb  3.5  /  TBili  0.4  /  DBili  x   /  AST  16  /  ALT  16  /  AlkPhos  123[H]  10-04    PT/INR - ( 04 Oct 2024 00:32 )   PT: 11.8 sec;   INR: 1.03 ratio         PTT - ( 04 Oct 2024 00:32 )  PTT:34.6 sec      Urinalysis Basic - ( 04 Oct 2024 13:19 )    Color: x / Appearance: x / SG: x / pH: x  Gluc: 169 mg/dL / Ketone: x  / Bili: x / Urobili: x   Blood: x / Protein: x / Nitrite: x   Leuk Esterase: x / RBC: x / WBC x   Sq Epi: x / Non Sq Epi: x / Bacteria: x        RADIOLOGY & ADDITIONAL TESTS:    Imaging Personally Reviewed:    Consultant(s) Notes Reviewed:      Care Discussed with Consultants/Other Providers:   DATE OF SERVICE: 10-05-24 @ 07:19    Patient is a 64y old  Female who presents with a chief complaint of GI Bleeding (04 Oct 2024 09:00)      INTERVAL/OVERNIGHT: NAOE. No additional bowel movements since large bloody BM yesterday.    SUBJECTIVE: Patient endorses headache, similar in quality to old headaches. She states she would like to eat regular food. Discussed with her important of CLD/adequate bowel prep to increase diagnostic yield of planned scope on Monday. Patient amenable to plan.    MEDICATIONS  (STANDING):  atorvastatin 20 milliGRAM(s) Oral at bedtime  dextrose 50% Injectable 25 Gram(s) IV Push once  glucagon  Injectable 1 milliGRAM(s) IntraMuscular once  influenza   Vaccine 0.5 milliLiter(s) IntraMuscular once  insulin glargine Injectable (LANTUS) 16 Unit(s) SubCutaneous at bedtime  insulin lispro (ADMELOG) corrective regimen sliding scale   SubCutaneous three times a day before meals  insulin lispro (ADMELOG) corrective regimen sliding scale   SubCutaneous at bedtime  insulin lispro Injectable (ADMELOG) 3 Unit(s) SubCutaneous three times a day before meals  pantoprazole  Injectable 40 milliGRAM(s) IV Push every 12 hours    MEDICATIONS  (PRN):  acetaminophen     Tablet .. 650 milliGRAM(s) Oral every 6 hours PRN Temp greater or equal to 38C (100.4F), Mild Pain (1 - 3)  dextrose Oral Gel 15 Gram(s) Oral once PRN Blood Glucose LESS THAN 70 milliGRAM(s)/deciliter      Vital Signs Last 24 Hrs  T(C): 36.4 (05 Oct 2024 04:58), Max: 36.9 (04 Oct 2024 12:13)  T(F): 97.6 (05 Oct 2024 04:58), Max: 98.4 (04 Oct 2024 12:13)  HR: 67 (05 Oct 2024 04:58) (54 - 84)  BP: 108/60 (05 Oct 2024 04:58) (98/58 - 130/57)  BP(mean): --  RR: 16 (05 Oct 2024 04:58) (16 - 20)  SpO2: 97% (05 Oct 2024 04:58) (97% - 100%)    Parameters below as of 05 Oct 2024 04:58  Patient On (Oxygen Delivery Method): room air      CAPILLARY BLOOD GLUCOSE      POCT Blood Glucose.: 128 mg/dL (04 Oct 2024 21:23)  POCT Blood Glucose.: 100 mg/dL (04 Oct 2024 17:15)  POCT Blood Glucose.: 164 mg/dL (04 Oct 2024 12:14)  POCT Blood Glucose.: 120 mg/dL (04 Oct 2024 10:28)  POCT Blood Glucose.: 101 mg/dL (04 Oct 2024 07:46)    I&O's Summary    04 Oct 2024 07:01  -  05 Oct 2024 07:00  --------------------------------------------------------  IN: 50 mL / OUT: 0 mL / NET: 50 mL        PHYSICAL EXAM:  GENERAL: NAD,  HEAD:  Atraumatic, Normocephalic  EYES: anicteric  NECK: Supple, No JVD  CHEST/LUNG: Clear to auscultation bilaterally; No wheeze  HEART: Regular rate and rhythm; No murmurs, rubs, or gallops  ABDOMEN: Soft, Nontender, Nondistended  EXTREMITIES: No b/l LE edema  NEUROLOGY: A&Ox3, non-focal  SKIN: No rashes or lesions    LABS:                        11.4   5.72  )-----------( 125      ( 04 Oct 2024 17:32 )             40.9     10-04    142  |  107  |  23  ----------------------------<  169[H]  3.7   |  20[L]  |  0.58    Ca    9.0      04 Oct 2024 13:19    TPro  6.2  /  Alb  3.5  /  TBili  0.4  /  DBili  x   /  AST  16  /  ALT  16  /  AlkPhos  123[H]  10-04    PT/INR - ( 04 Oct 2024 00:32 )   PT: 11.8 sec;   INR: 1.03 ratio         PTT - ( 04 Oct 2024 00:32 )  PTT:34.6 sec      Urinalysis Basic - ( 04 Oct 2024 13:19 )    Color: x / Appearance: x / SG: x / pH: x  Gluc: 169 mg/dL / Ketone: x  / Bili: x / Urobili: x   Blood: x / Protein: x / Nitrite: x   Leuk Esterase: x / RBC: x / WBC x   Sq Epi: x / Non Sq Epi: x / Bacteria: x        RADIOLOGY & ADDITIONAL TESTS:    Imaging Personally Reviewed:    Consultant(s) Notes Reviewed:      Care Discussed with Consultants/Other Providers:

## 2024-10-06 LAB
ANION GAP SERPL CALC-SCNC: 9 MMOL/L — SIGNIFICANT CHANGE UP (ref 5–17)
BILIRUB SERPL-MCNC: 0.3 MG/DL — SIGNIFICANT CHANGE UP (ref 0.2–1.2)
BLD GP AB SCN SERPL QL: NEGATIVE — SIGNIFICANT CHANGE UP
BUN SERPL-MCNC: 10 MG/DL — SIGNIFICANT CHANGE UP (ref 7–23)
CALCIUM SERPL-MCNC: 8.8 MG/DL — SIGNIFICANT CHANGE UP (ref 8.4–10.5)
CHLORIDE SERPL-SCNC: 107 MMOL/L — SIGNIFICANT CHANGE UP (ref 96–108)
CO2 SERPL-SCNC: 25 MMOL/L — SIGNIFICANT CHANGE UP (ref 22–31)
CREAT SERPL-MCNC: 0.61 MG/DL — SIGNIFICANT CHANGE UP (ref 0.5–1.3)
EGFR: 100 ML/MIN/1.73M2 — SIGNIFICANT CHANGE UP
GLUCOSE BLDC GLUCOMTR-MCNC: 127 MG/DL — HIGH (ref 70–99)
GLUCOSE BLDC GLUCOMTR-MCNC: 153 MG/DL — HIGH (ref 70–99)
GLUCOSE BLDC GLUCOMTR-MCNC: 175 MG/DL — HIGH (ref 70–99)
GLUCOSE BLDC GLUCOMTR-MCNC: 178 MG/DL — HIGH (ref 70–99)
GLUCOSE SERPL-MCNC: 107 MG/DL — HIGH (ref 70–99)
HCT VFR BLD CALC: 31.2 % — LOW (ref 34.5–45)
HGB BLD-MCNC: 9.4 G/DL — LOW (ref 11.5–15.5)
INR BLD: 1.08 RATIO — SIGNIFICANT CHANGE UP (ref 0.85–1.16)
MAGNESIUM SERPL-MCNC: 2 MG/DL — SIGNIFICANT CHANGE UP (ref 1.6–2.6)
MCHC RBC-ENTMCNC: 25.3 PG — LOW (ref 27–34)
MCHC RBC-ENTMCNC: 30.1 GM/DL — LOW (ref 32–36)
MCV RBC AUTO: 83.9 FL — SIGNIFICANT CHANGE UP (ref 80–100)
MELD SCORE WITH DIALYSIS: 21 POINTS — SIGNIFICANT CHANGE UP
MELD SCORE WITHOUT DIALYSIS: 7 POINTS — SIGNIFICANT CHANGE UP
NRBC # BLD: 0 /100 WBCS — SIGNIFICANT CHANGE UP (ref 0–0)
PHOSPHATE SERPL-MCNC: 3.4 MG/DL — SIGNIFICANT CHANGE UP (ref 2.5–4.5)
PLATELET # BLD AUTO: 148 K/UL — LOW (ref 150–400)
POTASSIUM SERPL-MCNC: 3.5 MMOL/L — SIGNIFICANT CHANGE UP (ref 3.5–5.3)
POTASSIUM SERPL-SCNC: 3.5 MMOL/L — SIGNIFICANT CHANGE UP (ref 3.5–5.3)
PROTHROM AB SERPL-ACNC: 12.4 SEC — SIGNIFICANT CHANGE UP (ref 9.9–13.4)
RBC # BLD: 3.72 M/UL — LOW (ref 3.8–5.2)
RBC # FLD: 15.8 % — HIGH (ref 10.3–14.5)
RH IG SCN BLD-IMP: POSITIVE — SIGNIFICANT CHANGE UP
SODIUM SERPL-SCNC: 141 MMOL/L — SIGNIFICANT CHANGE UP (ref 135–145)
WBC # BLD: 3.19 K/UL — LOW (ref 3.8–10.5)
WBC # FLD AUTO: 3.19 K/UL — LOW (ref 3.8–10.5)

## 2024-10-06 PROCEDURE — 99232 SBSQ HOSP IP/OBS MODERATE 35: CPT | Mod: GC

## 2024-10-06 PROCEDURE — 99233 SBSQ HOSP IP/OBS HIGH 50: CPT

## 2024-10-06 RX ORDER — POLYETHYLENE GLYCOL-3350, SODIUM CHLORIDE, POTASSIUM CHLORIDE AND SODIUM BICARBONATE 420; 11.2; 5.72; 1.48 G/438.4G; G/438.4G; G/438.4G; G/438.4G
4000 POWDER, FOR SOLUTION ORAL ONCE
Refills: 0 | Status: COMPLETED | OUTPATIENT
Start: 2024-10-06 | End: 2024-10-06

## 2024-10-06 RX ADMIN — Medication 3 UNIT(S): at 12:56

## 2024-10-06 RX ADMIN — Medication 3 UNIT(S): at 17:35

## 2024-10-06 RX ADMIN — POLYETHYLENE GLYCOL-3350, SODIUM CHLORIDE, POTASSIUM CHLORIDE AND SODIUM BICARBONATE 4000 MILLILITER(S): 420; 11.2; 5.72; 1.48 POWDER, FOR SOLUTION ORAL at 17:10

## 2024-10-06 RX ADMIN — INSULIN GLARGINE 8 UNIT(S): 300 INJECTION, SOLUTION SUBCUTANEOUS at 22:31

## 2024-10-06 RX ADMIN — Medication 3 UNIT(S): at 09:01

## 2024-10-06 RX ADMIN — Medication 1: at 12:56

## 2024-10-06 RX ADMIN — PANTOPRAZOLE SODIUM 40 MILLIGRAM(S): 40 TABLET, DELAYED RELEASE ORAL at 17:10

## 2024-10-06 RX ADMIN — Medication 1: at 17:35

## 2024-10-06 RX ADMIN — ATORVASTATIN CALCIUM 20 MILLIGRAM(S): 10 TABLET, FILM COATED ORAL at 22:31

## 2024-10-06 RX ADMIN — PANTOPRAZOLE SODIUM 40 MILLIGRAM(S): 40 TABLET, DELAYED RELEASE ORAL at 06:08

## 2024-10-06 NOTE — PROGRESS NOTE ADULT - PROBLEM SELECTOR PLAN 3
1 year history of dyspnea on mild exertion and LE edema, c/f possible heart failure  -echocardiogram with EF 71%, no diastolic dysfunction  -check BNP

## 2024-10-06 NOTE — PROGRESS NOTE ADULT - ASSESSMENT
63 yo F with PMH of breast cancer 2023 s/p mastectomy, GI bleed 2023, pernicious anemia, HTN, HLD, and T2DM presenting for hematochezia for 3 days, found to have drop in Hgb from 14 to 11 with blood on rectal exam.     Impression:  #Hematochezia  #Hx of GI bleed 2023    P/w 3d of hematochezia with brisk rectal bleeding since last night and accompanied dizziness and fatigue. Labs notable for Hgb 12.2 on admission from 14 then fell to 11.3. Suspect diverticular bleed given brisk large volume intermittent episodes of hematochezia and CT findings of colonic diverticuli and prior colonoscopy 2023 showing diverticuli in hepatic flexure and ascending colon. Pt had similar presentation 10/2023 with negative workup including small bowel enteroscopy, capsule, and colonoscopy other than diverticuli. Suspect prior bleed also diverticular in origin. DDx also includes hemorrhoidal bleed, mass, or polyp. UGIB also on ddx given significant bleeding but less likely especially given nl EGD last year. Remains hemodynamically stable    Recommendations:  - Plan for colonoscopy +/- EGD Monday  - Trend cbc, transfuse Hgb>7, active T&S  - CLD Sunday prior to procedure  - Make NPO midnight day of procedure  - Will order 4 L Golytely prep for day prior to procedure. If stool not clear by 12 am, give additional 2 L prep  - Please obtain early AM labs prior to the procedure (cbc, bmp, coags, T&S)  - Hold DVT ppx night before procedure  - If hemodynamically significant bleed, obtain stat CTA    Note incomplete until finalized by attending signature/attestation.    Idalia Gonzales MD  GI/Hepatology Fellow, PGY-4  Long Range Pager: [phone], Short Range Pager: [phone]    MONDAY-FRIDAY 8AM-5PM:  Please message via [employer] or email [email or web address] OR [email or web address]   On Weekends/Holidays (All day) and Weekdays after 5 PM to 8 AM  For nonurgent consults please email:  Please email [email or web address]    URGENT CONSULTS:  Please contact on call GI team. See Toro cruz ([facility]), [facility] paging system ([facility]), or call hospital  ([facility])

## 2024-10-06 NOTE — PROGRESS NOTE ADULT - SUBJECTIVE AND OBJECTIVE BOX
***************************************************************  Annita Phelps, PGY 2  Internal Medicine   ***************************************************************    SILVESTRE FLORES  64y  MRN: [id number]    Patient is a 64y old  Female who presents with a chief complaint of GI Bleeding (05 Oct 2024 07:18)      Subjective: no events ON. Denies fever, CP, SOB, abn pain, N/V, dysuria. Tolerating diet.      MEDICATIONS  (STANDING):  atorvastatin 20 milliGRAM(s) Oral at bedtime  dextrose 50% Injectable 25 Gram(s) IV Push once  glucagon  Injectable 1 milliGRAM(s) IntraMuscular once  influenza   Vaccine 0.5 milliLiter(s) IntraMuscular once  insulin glargine Injectable (LANTUS) 8 Unit(s) SubCutaneous at bedtime  insulin lispro (ADMELOG) corrective regimen sliding scale   SubCutaneous at bedtime  insulin lispro (ADMELOG) corrective regimen sliding scale   SubCutaneous three times a day before meals  insulin lispro Injectable (ADMELOG) 3 Unit(s) SubCutaneous three times a day before meals  pantoprazole  Injectable 40 milliGRAM(s) IV Push every 12 hours    MEDICATIONS  (PRN):  acetaminophen     Tablet .. 650 milliGRAM(s) Oral every 6 hours PRN Temp greater or equal to 38C (100.4F), Mild Pain (1 - 3)  dextrose Oral Gel 15 Gram(s) Oral once PRN Blood Glucose LESS THAN 70 milliGRAM(s)/deciliter      Objective:    Vitals: Vital Signs Last 24 Hrs  T(C): 36.6 (10-06-24 @ 03:48), Max: 36.9 (10-05-24 @ 14:25)  T(F): 97.8 (10-06-24 @ 03:48), Max: 98.4 (10-05-24 @ 14:25)  HR: 69 (10-06-24 @ 03:48) (66 - 106)  BP: 101/54 (10-06-24 @ 03:48) (100/64 - 123/50)  BP(mean): --  RR: 18 (10-06-24 @ 03:48) (18 - 18)  SpO2: 98% (10-06-24 @ 03:48) (98% - 100%)            I&O's Summary      PHYSICAL EXAM:  GENERAL: NAD  HEAD:  Atraumatic, Normocephalic  EYES: EOMI, conjunctiva and sclera clear  CHEST/LUNG: Clear to auscultation bilaterally; No rales, rhonchi, wheezing, or rubs  HEART: Regular rate and rhythm; No murmurs, rubs, or gallops  ABDOMEN: Soft, Nontender, Nondistended;   SKIN: No rashes or lesions  NERVOUS SYSTEM:  Alert & Oriented X3, no focal deficits    LABS:  10-06    141  |  107  |  10  ----------------------------<  107[H]  3.5   |  25  |  0.61  10-05    141  |  107  |  14  ----------------------------<  119[H]  4.5   |  25  |  0.63  10-04    142  |  107  |  23  ----------------------------<  169[H]  3.7   |  20[L]  |  0.58    Ca    8.8      06 Oct 2024 07:13  Ca    8.9      05 Oct 2024 07:25  Ca    9.0      04 Oct 2024 13:19  Phos  3.4     10-06  Mg     2.0     10-06    TPro  x   /  Alb  x   /  TBili  0.3  /  DBili  x   /  AST  x   /  ALT  x   /  AlkPhos  x   10-06  TPro  5.9[L]  /  Alb  3.3  /  TBili  0.3  /  DBili  x   /  AST  15  /  ALT  15  /  AlkPhos  107  10-05  TPro  6.2  /  Alb  3.5  /  TBili  0.4  /  DBili  x   /  AST  16  /  ALT  16  /  AlkPhos  123[H]  10-04      PT/INR - ( 06 Oct 2024 07:13 )   PT: 12.4 sec;   INR: 1.08 ratio                       Urinalysis Basic - ( 06 Oct 2024 07:13 )    Color: x / Appearance: x / SG: x / pH: x  Gluc: 107 mg/dL / Ketone: x  / Bili: x / Urobili: x   Blood: x / Protein: x / Nitrite: x   Leuk Esterase: x / RBC: x / WBC x   Sq Epi: x / Non Sq Epi: x / Bacteria: x                              9.9    3.87  )-----------( 162      ( 05 Oct 2024 22:13 )             32.8                         10.1   3.97  )-----------( 143      ( 05 Oct 2024 07:26 )             33.7                         11.4   5.72  )-----------( 125      ( 04 Oct 2024 17:32 )             40.9     CAPILLARY BLOOD GLUCOSE      POCT Blood Glucose.: 87 mg/dL (05 Oct 2024 21:45)  POCT Blood Glucose.: 181 mg/dL (05 Oct 2024 17:33)  POCT Blood Glucose.: 116 mg/dL (05 Oct 2024 13:11)  POCT Blood Glucose.: 153 mg/dL (05 Oct 2024 08:30)      RADIOLOGY & ADDITIONAL TESTS:       ***************************************************************  Annita Mattie, PGY 2  Internal Medicine   ***************************************************************    SILVESTRE FLORES  64y  MRN: [id number]    Patient is a 64y old  Female who presents with a chief complaint of GI Bleeding (05 Oct 2024 07:18)      Subjective: no events ON. Seen at bedside. Reports last BM Friday, currently tolerating CLD. Denies fever, CP, SOB, abn pain, N/V.     MEDICATIONS  (STANDING):  atorvastatin 20 milliGRAM(s) Oral at bedtime  dextrose 50% Injectable 25 Gram(s) IV Push once  glucagon  Injectable 1 milliGRAM(s) IntraMuscular once  influenza   Vaccine 0.5 milliLiter(s) IntraMuscular once  insulin glargine Injectable (LANTUS) 8 Unit(s) SubCutaneous at bedtime  insulin lispro (ADMELOG) corrective regimen sliding scale   SubCutaneous at bedtime  insulin lispro (ADMELOG) corrective regimen sliding scale   SubCutaneous three times a day before meals  insulin lispro Injectable (ADMELOG) 3 Unit(s) SubCutaneous three times a day before meals  pantoprazole  Injectable 40 milliGRAM(s) IV Push every 12 hours    MEDICATIONS  (PRN):  acetaminophen     Tablet .. 650 milliGRAM(s) Oral every 6 hours PRN Temp greater or equal to 38C (100.4F), Mild Pain (1 - 3)  dextrose Oral Gel 15 Gram(s) Oral once PRN Blood Glucose LESS THAN 70 milliGRAM(s)/deciliter      Objective:    Vitals: Vital Signs Last 24 Hrs  T(C): 36.6 (10-06-24 @ 03:48), Max: 36.9 (10-05-24 @ 14:25)  T(F): 97.8 (10-06-24 @ 03:48), Max: 98.4 (10-05-24 @ 14:25)  HR: 69 (10-06-24 @ 03:48) (66 - 106)  BP: 101/54 (10-06-24 @ 03:48) (100/64 - 123/50)  BP(mean): --  RR: 18 (10-06-24 @ 03:48) (18 - 18)  SpO2: 98% (10-06-24 @ 03:48) (98% - 100%)            I&O's Summary      PHYSICAL EXAM:  GENERAL: NAD  HEAD:  Atraumatic, Normocephalic  EYES: EOMI, conjunctiva and sclera clear  CHEST/LUNG: Clear to auscultation bilaterally; No rales, rhonchi, wheezing, or rubs  HEART: Regular rate and rhythm; No murmurs, rubs, or gallops  ABDOMEN: Soft, Nontender, Nondistended;   SKIN: No rashes or lesions  NERVOUS SYSTEM:  Alert & Oriented X3, no focal deficits    LABS:  10-06    141  |  107  |  10  ----------------------------<  107[H]  3.5   |  25  |  0.61  10-05    141  |  107  |  14  ----------------------------<  119[H]  4.5   |  25  |  0.63  10-04    142  |  107  |  23  ----------------------------<  169[H]  3.7   |  20[L]  |  0.58    Ca    8.8      06 Oct 2024 07:13  Ca    8.9      05 Oct 2024 07:25  Ca    9.0      04 Oct 2024 13:19  Phos  3.4     10-06  Mg     2.0     10-06    TPro  x   /  Alb  x   /  TBili  0.3  /  DBili  x   /  AST  x   /  ALT  x   /  AlkPhos  x   10-06  TPro  5.9[L]  /  Alb  3.3  /  TBili  0.3  /  DBili  x   /  AST  15  /  ALT  15  /  AlkPhos  107  10-05  TPro  6.2  /  Alb  3.5  /  TBili  0.4  /  DBili  x   /  AST  16  /  ALT  16  /  AlkPhos  123[H]  10-04      PT/INR - ( 06 Oct 2024 07:13 )   PT: 12.4 sec;   INR: 1.08 ratio                       Urinalysis Basic - ( 06 Oct 2024 07:13 )    Color: x / Appearance: x / SG: x / pH: x  Gluc: 107 mg/dL / Ketone: x  / Bili: x / Urobili: x   Blood: x / Protein: x / Nitrite: x   Leuk Esterase: x / RBC: x / WBC x   Sq Epi: x / Non Sq Epi: x / Bacteria: x                              9.9    3.87  )-----------( 162      ( 05 Oct 2024 22:13 )             32.8                         10.1   3.97  )-----------( 143      ( 05 Oct 2024 07:26 )             33.7                         11.4   5.72  )-----------( 125      ( 04 Oct 2024 17:32 )             40.9     CAPILLARY BLOOD GLUCOSE      POCT Blood Glucose.: 87 mg/dL (05 Oct 2024 21:45)  POCT Blood Glucose.: 181 mg/dL (05 Oct 2024 17:33)  POCT Blood Glucose.: 116 mg/dL (05 Oct 2024 13:11)  POCT Blood Glucose.: 153 mg/dL (05 Oct 2024 08:30)      RADIOLOGY & ADDITIONAL TESTS:

## 2024-10-06 NOTE — PROGRESS NOTE ADULT - PROBLEM SELECTOR PLAN 1
Intermittent episodes of bright red hematochezia. Hemodynamic stability favors LGIB rather than UGIB. C/b RRT on 10/4/24 for large GI bleed.  - CT negative for source of bleeding  - diverticuli on CT AP, may be source of bleeding  - appreciate GI recs  - CLD for now pending EGD/colo  - endo/colonoscopy planned for monday  - maintain 2 large bore IVs  - protonix 40 BID  - if patient has large hemodynamically significant bleed, can consider CT angio abd pelvis to localize bleed, consider IR embo

## 2024-10-06 NOTE — PROGRESS NOTE ADULT - PROBLEM SELECTOR PLAN 4
1 year history of progressive dyspnea on mild exertion and LE edema, c/f possible heart failure vs. PAH vs other  - per patient report, outpatient doppler negative for DVT  - TTE as above  - BNP

## 2024-10-06 NOTE — PROGRESS NOTE ADULT - SUBJECTIVE AND OBJECTIVE BOX
Gastroenterology/Hepatology Progress Note    Interval Events:     No acute events overnight. Aishwarya has not passed BM since Friday.     Allergies:  No Known Allergies      Hospital Medications:  acetaminophen     Tablet .. 650 milliGRAM(s) Oral every 6 hours PRN  atorvastatin 20 milliGRAM(s) Oral at bedtime  dextrose 50% Injectable 25 Gram(s) IV Push once  dextrose Oral Gel 15 Gram(s) Oral once PRN  glucagon  Injectable 1 milliGRAM(s) IntraMuscular once  influenza   Vaccine 0.5 milliLiter(s) IntraMuscular once  insulin glargine Injectable (LANTUS) 8 Unit(s) SubCutaneous at bedtime  insulin lispro (ADMELOG) corrective regimen sliding scale   SubCutaneous at bedtime  insulin lispro (ADMELOG) corrective regimen sliding scale   SubCutaneous three times a day before meals  insulin lispro Injectable (ADMELOG) 3 Unit(s) SubCutaneous three times a day before meals  pantoprazole  Injectable 40 milliGRAM(s) IV Push every 12 hours      ROS: 14 point ROS negative unless otherwise state in subjective    PHYSICAL EXAM:   Vital Signs:  Vital Signs Last 24 Hrs  T(C): 36.6 (06 Oct 2024 12:45), Max: 36.9 (05 Oct 2024 14:25)  T(F): 97.8 (06 Oct 2024 12:45), Max: 98.4 (05 Oct 2024 14:25)  HR: 68 (06 Oct 2024 12:45) (66 - 106)  BP: 98/61 (06 Oct 2024 12:45) (98/61 - 123/50)  BP(mean): --  RR: 18 (06 Oct 2024 12:45) (18 - 18)  SpO2: 95% (06 Oct 2024 12:45) (95% - 100%)    Parameters below as of 06 Oct 2024 12:45  Patient On (Oxygen Delivery Method): room air      Daily     Daily     GENERAL:  No acute distress  HEENT:  NCAT, no scleral icterus  CHEST: no resp distress  HEART:  RRR  ABDOMEN:  Soft, non-tender, non-distended, normoactive bowel sounds, no masses  EXTREMITIES:  No cyanosis, clubbing, or edema  SKIN:  No rash/erythema/ecchymoses/petechiae/wounds/abscess/warm/dry  NEURO:  Alert and oriented x 3, no asterixis, no tremor    LABS:                        9.4    3.19  )-----------( 148      ( 06 Oct 2024 07:13 )             31.2     Mean Cell Volume: 83.9 fl (10-06-24 @ 07:13)    10-06    141  |  107  |  10  ----------------------------<  107[H]  3.5   |  25  |  0.61    Ca    8.8      06 Oct 2024 07:13  Phos  3.4     10-06  Mg     2.0     10-06    TPro  x   /  Alb  x   /  TBili  0.3  /  DBili  x   /  AST  x   /  ALT  x   /  AlkPhos  x   10-06    LIVER FUNCTIONS - ( 05 Oct 2024 07:25 )  Alb: 3.3 g/dL / Pro: 5.9 g/dL / ALK PHOS: 107 U/L / ALT: 15 U/L / AST: 15 U/L / GGT: x           PT/INR - ( 06 Oct 2024 07:13 )   PT: 12.4 sec;   INR: 1.08 ratio           Urinalysis Basic - ( 06 Oct 2024 07:13 )    Color: x / Appearance: x / SG: x / pH: x  Gluc: 107 mg/dL / Ketone: x  / Bili: x / Urobili: x   Blood: x / Protein: x / Nitrite: x   Leuk Esterase: x / RBC: x / WBC x   Sq Epi: x / Non Sq Epi: x / Bacteria: x            Imaging:

## 2024-10-07 ENCOUNTER — RESULT REVIEW (OUTPATIENT)
Age: 64
End: 2024-10-07

## 2024-10-07 LAB
ANION GAP SERPL CALC-SCNC: 9 MMOL/L — SIGNIFICANT CHANGE UP (ref 5–17)
APTT BLD: 35.2 SEC — SIGNIFICANT CHANGE UP (ref 24.5–35.6)
BLD GP AB SCN SERPL QL: NEGATIVE — SIGNIFICANT CHANGE UP
BUN SERPL-MCNC: 8 MG/DL — SIGNIFICANT CHANGE UP (ref 7–23)
CALCIUM SERPL-MCNC: 8.6 MG/DL — SIGNIFICANT CHANGE UP (ref 8.4–10.5)
CHLORIDE SERPL-SCNC: 107 MMOL/L — SIGNIFICANT CHANGE UP (ref 96–108)
CO2 SERPL-SCNC: 27 MMOL/L — SIGNIFICANT CHANGE UP (ref 22–31)
CREAT SERPL-MCNC: 0.56 MG/DL — SIGNIFICANT CHANGE UP (ref 0.5–1.3)
EGFR: 102 ML/MIN/1.73M2 — SIGNIFICANT CHANGE UP
GLUCOSE BLDC GLUCOMTR-MCNC: 105 MG/DL — HIGH (ref 70–99)
GLUCOSE BLDC GLUCOMTR-MCNC: 124 MG/DL — HIGH (ref 70–99)
GLUCOSE BLDC GLUCOMTR-MCNC: 144 MG/DL — HIGH (ref 70–99)
GLUCOSE BLDC GLUCOMTR-MCNC: 264 MG/DL — HIGH (ref 70–99)
GLUCOSE BLDC GLUCOMTR-MCNC: 66 MG/DL — LOW (ref 70–99)
GLUCOSE BLDC GLUCOMTR-MCNC: 91 MG/DL — SIGNIFICANT CHANGE UP (ref 70–99)
GLUCOSE SERPL-MCNC: 113 MG/DL — HIGH (ref 70–99)
HCT VFR BLD CALC: 29.6 % — LOW (ref 34.5–45)
HGB BLD-MCNC: 8.9 G/DL — LOW (ref 11.5–15.5)
INR BLD: 1.15 RATIO — SIGNIFICANT CHANGE UP (ref 0.85–1.16)
MAGNESIUM SERPL-MCNC: 1.9 MG/DL — SIGNIFICANT CHANGE UP (ref 1.6–2.6)
MCHC RBC-ENTMCNC: 24.8 PG — LOW (ref 27–34)
MCHC RBC-ENTMCNC: 30.1 GM/DL — LOW (ref 32–36)
MCV RBC AUTO: 82.5 FL — SIGNIFICANT CHANGE UP (ref 80–100)
NRBC # BLD: 0 /100 WBCS — SIGNIFICANT CHANGE UP (ref 0–0)
PHOSPHATE SERPL-MCNC: 2.7 MG/DL — SIGNIFICANT CHANGE UP (ref 2.5–4.5)
PLATELET # BLD AUTO: 148 K/UL — LOW (ref 150–400)
POTASSIUM SERPL-MCNC: 3.2 MMOL/L — LOW (ref 3.5–5.3)
POTASSIUM SERPL-SCNC: 3.2 MMOL/L — LOW (ref 3.5–5.3)
PROTHROM AB SERPL-ACNC: 13.1 SEC — SIGNIFICANT CHANGE UP (ref 9.9–13.4)
RBC # BLD: 3.59 M/UL — LOW (ref 3.8–5.2)
RBC # FLD: 15.5 % — HIGH (ref 10.3–14.5)
RH IG SCN BLD-IMP: POSITIVE — SIGNIFICANT CHANGE UP
SODIUM SERPL-SCNC: 143 MMOL/L — SIGNIFICANT CHANGE UP (ref 135–145)
WBC # BLD: 3.17 K/UL — LOW (ref 3.8–10.5)
WBC # FLD AUTO: 3.17 K/UL — LOW (ref 3.8–10.5)

## 2024-10-07 PROCEDURE — 45380 COLONOSCOPY AND BIOPSY: CPT

## 2024-10-07 PROCEDURE — 88305 TISSUE EXAM BY PATHOLOGIST: CPT | Mod: 26

## 2024-10-07 PROCEDURE — 99233 SBSQ HOSP IP/OBS HIGH 50: CPT | Mod: GC

## 2024-10-07 DEVICE — NET RETRV ROT ROTH 2.5MMX230CM: Type: IMPLANTABLE DEVICE | Status: FUNCTIONAL

## 2024-10-07 RX ORDER — FAMOTIDINE 40 MG
20 TABLET ORAL ONCE
Refills: 0 | Status: COMPLETED | OUTPATIENT
Start: 2024-10-07 | End: 2024-10-07

## 2024-10-07 RX ORDER — SODIUM CHLORIDE 0.9 % (FLUSH) 0.9 %
500 SYRINGE (ML) INJECTION
Refills: 0 | Status: COMPLETED | OUTPATIENT
Start: 2024-10-07 | End: 2024-10-07

## 2024-10-07 RX ORDER — POLYETHYLENE GLYCOL-3350, SODIUM CHLORIDE, POTASSIUM CHLORIDE AND SODIUM BICARBONATE 420; 11.2; 5.72; 1.48 G/438.4G; G/438.4G; G/438.4G; G/438.4G
1000 POWDER, FOR SOLUTION ORAL ONCE
Refills: 0 | Status: COMPLETED | OUTPATIENT
Start: 2024-10-07 | End: 2024-10-07

## 2024-10-07 RX ORDER — ACETAMINOPHEN 325 MG
1000 TABLET ORAL ONCE
Refills: 0 | Status: COMPLETED | OUTPATIENT
Start: 2024-10-07 | End: 2024-10-07

## 2024-10-07 RX ADMIN — Medication 100 MILLIEQUIVALENT(S): at 10:00

## 2024-10-07 RX ADMIN — POLYETHYLENE GLYCOL-3350, SODIUM CHLORIDE, POTASSIUM CHLORIDE AND SODIUM BICARBONATE 1000 MILLILITER(S): 420; 11.2; 5.72; 1.48 POWDER, FOR SOLUTION ORAL at 09:10

## 2024-10-07 RX ADMIN — Medication 100 MILLIEQUIVALENT(S): at 08:34

## 2024-10-07 RX ADMIN — Medication 30 MILLILITER(S): at 16:29

## 2024-10-07 RX ADMIN — PANTOPRAZOLE SODIUM 40 MILLIGRAM(S): 40 TABLET, DELAYED RELEASE ORAL at 05:04

## 2024-10-07 RX ADMIN — INSULIN GLARGINE 8 UNIT(S): 300 INJECTION, SOLUTION SUBCUTANEOUS at 23:14

## 2024-10-07 RX ADMIN — Medication 20 MILLIGRAM(S): at 17:24

## 2024-10-07 RX ADMIN — Medication 400 MILLIGRAM(S): at 17:04

## 2024-10-07 RX ADMIN — Medication 1: at 23:14

## 2024-10-07 RX ADMIN — Medication 100 MILLIEQUIVALENT(S): at 11:00

## 2024-10-07 RX ADMIN — ATORVASTATIN CALCIUM 20 MILLIGRAM(S): 10 TABLET, FILM COATED ORAL at 23:14

## 2024-10-07 NOTE — PHYSICAL THERAPY INITIAL EVALUATION ADULT - ADDITIONAL COMMENTS
Pt states she lives with fikourtney in a private home with 6-8 steps to enter and 20 steps to bedroom. Pt is independent in ADLs. No DMEs, home care or HHA services are present. Pt states she lives with fiance in a private home with 6-8 steps to enter and 20 steps to bedroom. Pt is independent in ADLs, and independent with ambulation. No DMEs, home care or HHA services are present.

## 2024-10-07 NOTE — PROGRESS NOTE ADULT - ASSESSMENT
63 y/o female with PMH of HTN, DM, HLD, pernicious anemia, and previous hospitalization 1 year ago for GI bleeding of unknown source, presenting with bright-red hematochezia and dyspnea on exertion. Hemodynamically stable on admission. Labs significant for mild anemia, hgb 11.3, unlikely to explain dyspnea on exertion sx's. CT abdomen neg for source of bleeding. Given LE edema and MUNOZ, concern for alternate etiology to MUNOZ other than blood loss anemia, such as new onset CHF 65 y/o female with PMH of HTN, DM, HLD, pernicious anemia, and previous hospitalization 1 year ago for GI bleeding of unknown source, presenting with bright-red hematochezia and dyspnea on exertion. Likely diverticular bleed

## 2024-10-07 NOTE — PRE PROCEDURE NOTE - PRE PROCEDURE EVALUATION
Attending Physician: Dr Ailin Coronado                            Procedure: EGD/colonoscopy    Indication for Procedure: GIB   ________________________________________________________  PAST MEDICAL & SURGICAL HISTORY:  HLD (hyperlipidemia)      DM (diabetes mellitus)      HTN (hypertension)      Pernicious anemia      History of GI bleed      Fibroids      Abnormal uterine bleeding (AUB)      History of hysterectomy      History of bilateral salpingo-oophorectomy (BSO)      History of appendectomy        ALLERGIES:  No Known Allergies    HOME MEDICATIONS:  amlodipine-olmesartan 5 mg-20 mg oral tablet: 1 tab(s) orally once a day  cyanocobalamin 500 mcg/0.1 mL nasal gel: 1 spray(s) nasal once a day  Jardiance 10 mg oral tablet: 1 tab(s) orally once a day  Lipitor 20 mg oral tablet: 1 tab(s) orally once a day (at bedtime)  NovoLOG FlexPen 100 units/mL injectable solution: 6 unit(s) injectable 3 times a day (with meals)  Tresiba FlexTouch 100 units/mL subcutaneous solution: 24 unit(s) subcutaneous once a day (at bedtime)    AICD/PPM: [ ] yes   [ x] no    PERTINENT LAB DATA:                        8.9    3.17  )-----------( 148      ( 07 Oct 2024 05:27 )             29.6     10-07    143  |  107  |  8   ----------------------------<  113[H]  3.2[L]   |  27  |  0.56    Ca    8.6      07 Oct 2024 05:28  Phos  2.7     10-07  Mg     1.9     10-07    TPro  x   /  Alb  x   /  TBili  0.3  /  DBili  x   /  AST  x   /  ALT  x   /  AlkPhos  x   10-06    PT/INR - ( 07 Oct 2024 05:27 )   PT: 13.1 sec;   INR: 1.15 ratio         PTT - ( 07 Oct 2024 05:27 )  PTT:35.2 sec            PHYSICAL EXAMINATION:    T(C): 36.6  HR: 60  BP: 120/68  RR: 18  SpO2: 95%    Constitutional: NAD    Neck:  No JVD  Respiratory: normal effort, no respiratory distress   Cardiovascular: RRR  Extremities: No peripheral edema  Neurological: A/O x 4, no focal deficits        COMMENTS:    The patient is a suitable candidate for the planned procedure unless box checked [ ]  No, explain:

## 2024-10-07 NOTE — PROGRESS NOTE ADULT - PROBLEM SELECTOR PLAN 1
Intermittent episodes of bright red hematochezia. Hemodynamic stability favors LGIB rather than UGIB. C/b RRT on 10/4/24 for large GI bleed.  - CT negative for source of bleeding  - diverticuli on CT AP, may be source of bleeding  - appreciate GI recs  - CLD for now pending EGD/colo  - endo/colonoscopy planned for monday  - maintain 2 large bore IVs  - protonix 40 BID  - if patient has large hemodynamically significant bleed, can consider CT angio abd pelvis to localize bleed, consider IR embo Intermittent episodes of bright red hematochezia. Hemodynamic stability favors LGIB rather than UGIB. C/b RRT on 10/4/24 for large GI bleed.  - CT negative for source of bleeding  - diverticuli on CT AP, may be source of bleeding  - appreciate GI recs  - CLD for now pending EGD/colo  - endo/colonoscopy planned for monday  - maintain 2 large bore IVs  - protonix 40 BID  - if patient has large hemodynamically significant bleed, can consider CT angio abd pelvis to localize bleed, consider IR embo  - if diverticular bleed, consider colorectal surg c/s for sigmoid colectomy

## 2024-10-07 NOTE — PHYSICAL THERAPY INITIAL EVALUATION ADULT - NSPTDISCHREC_GEN_A_CORE
NPT, pt demonstrated abilities to ambulate safely and independently.  Pt cleared from a PT perspective, no further PT needs at this point in hospital setting./No skilled PT needs

## 2024-10-07 NOTE — PROVIDER CONTACT NOTE (OTHER) - ACTION/TREATMENT ORDERED:
IV tylenol given, pending orders for pepcid and simethicone. will continue to monitor patient status
Md ordered to halve dose of Insulin lantus from 16 units to 8 units

## 2024-10-07 NOTE — PROGRESS NOTE ADULT - PROBLEM SELECTOR PLAN 3
1 year history of dyspnea on mild exertion and LE edema, c/f possible heart failure  -echocardiogram with EF 71%, no diastolic dysfunction  -check BNP 1 year history of dyspnea on mild exertion and LE edema. Low concern for CHF given normal echo. More likely deconditioning vs. anemia  -echocardiogram with EF 71%, no diastolic dysfunction  -BNP unremarkable

## 2024-10-07 NOTE — PHYSICAL THERAPY INITIAL EVALUATION ADULT - PERTINENT HX OF CURRENT PROBLEM, REHAB EVAL
Pt is a 64 year old female with PMH significant for HTN, DM, HLD, pernicious anemia, and previous hospitalization 1 year ago for GI bleeding of unknown source.  Pt presents to the ED for evaluation following an episode of bright red blood in her stool. Patient reports that she noticed some spotting blood when wiping after stooling on Monday and Tuesday, but there was not enough blood to make her concerned. Patient reports that she began to feel generally unwell yesterday while staying at her daughters house, with a headache and some stomach discomfort/mild nausea, which she attributed to not eating very much throughout the day. Patient then went to go stool and saw a large amount of bright red and maroon blood in the bowl, which concerned her enough to call her doctor, who recommended that she go to the ED for evaluation.   Patient reports she has been hospitalized for one episode of GI bleeding in the past following a b/l mastectomy. She received 11 transfusions and was eventually diagnosed with pernicous anemia before d/c, but the initial source of bleeding was never determined. Patient has been following up with hematologist for the past year, receiving regular iron and B12 infusions.  Hemodynamically stable on admission. Labs significant for mild anemia, hgb 11.3, unlikely to explain dyspnea on exertion sx's. CT abdomen neg for source of bleeding. Given LE edema and MUNOZ, concern for alternate etiology to MUNOZ other than blood loss anemia, such as new onset CHF.  Pt with RRT on 10/4 for large bloody BM and pt dizzy in bathroom. Plan for endoscopy Monday 10/7.  VA Duplex LLE(10/4): No evidence of left lower extremity deep venous thrombosis.  CXR(10/04): Clear lungs  CT A&P(10/4): No CT evidence of active GI bleed.

## 2024-10-07 NOTE — PACU DISCHARGE NOTE - COMMENTS
Patient with belching. Discussed with endoscopist, likely gas. Abdomen is soft and non-tender. Will order simethicone and pepcid.

## 2024-10-07 NOTE — PROGRESS NOTE ADULT - SUBJECTIVE AND OBJECTIVE BOX
DATE OF SERVICE: 10-07-24 @ 07:22    Patient is a 64y old  Female who presents with a chief complaint of GI Bleeding (06 Oct 2024 14:11)      INTERVAL/OVERNIGHT:    SUBJECTIVE:    MEDICATIONS  (STANDING):  atorvastatin 20 milliGRAM(s) Oral at bedtime  dextrose 50% Injectable 25 Gram(s) IV Push once  glucagon  Injectable 1 milliGRAM(s) IntraMuscular once  influenza   Vaccine 0.5 milliLiter(s) IntraMuscular once  insulin glargine Injectable (LANTUS) 8 Unit(s) SubCutaneous at bedtime  insulin lispro (ADMELOG) corrective regimen sliding scale   SubCutaneous at bedtime  insulin lispro (ADMELOG) corrective regimen sliding scale   SubCutaneous three times a day before meals  insulin lispro Injectable (ADMELOG) 3 Unit(s) SubCutaneous three times a day before meals  pantoprazole  Injectable 40 milliGRAM(s) IV Push every 12 hours  potassium chloride    Tablet ER 20 milliEquivalent(s) Oral once  potassium chloride  10 mEq/100 mL IVPB 10 milliEquivalent(s) IV Intermittent every 1 hour    MEDICATIONS  (PRN):  acetaminophen     Tablet .. 650 milliGRAM(s) Oral every 6 hours PRN Temp greater or equal to 38C (100.4F), Mild Pain (1 - 3)  dextrose Oral Gel 15 Gram(s) Oral once PRN Blood Glucose LESS THAN 70 milliGRAM(s)/deciliter      Vital Signs Last 24 Hrs  T(C): 36.6 (07 Oct 2024 05:04), Max: 36.7 (06 Oct 2024 08:15)  T(F): 97.9 (07 Oct 2024 05:04), Max: 98 (06 Oct 2024 08:15)  HR: 76 (07 Oct 2024 05:04) (67 - 82)  BP: 118/63 (07 Oct 2024 05:04) (98/61 - 128/79)  BP(mean): --  RR: 18 (07 Oct 2024 05:04) (18 - 18)  SpO2: 98% (07 Oct 2024 05:04) (95% - 100%)    Parameters below as of 07 Oct 2024 05:04  Patient On (Oxygen Delivery Method): room air      CAPILLARY BLOOD GLUCOSE      POCT Blood Glucose.: 153 mg/dL (06 Oct 2024 22:17)  POCT Blood Glucose.: 178 mg/dL (06 Oct 2024 17:27)  POCT Blood Glucose.: 175 mg/dL (06 Oct 2024 12:17)  POCT Blood Glucose.: 127 mg/dL (06 Oct 2024 08:51)    I&O's Summary    06 Oct 2024 07:01  -  07 Oct 2024 07:00  --------------------------------------------------------  IN: 477 mL / OUT: 0 mL / NET: 477 mL        PHYSICAL EXAM:  GENERAL: NAD,  HEAD:  Atraumatic, Normocephalic  EYES: anicteric  NECK: Supple, No JVD  CHEST/LUNG: Clear to auscultation bilaterally; No wheeze  HEART: Regular rate and rhythm; No murmurs, rubs, or gallops  ABDOMEN: Soft, Nontender, Nondistended  EXTREMITIES: No b/l LE edema  NEUROLOGY: A&Ox3, non-focal  SKIN: No rashes or lesions    LABS:                        8.9    3.17  )-----------( 148      ( 07 Oct 2024 05:27 )             29.6     10-07    143  |  107  |  8   ----------------------------<  113[H]  3.2[L]   |  27  |  0.56    Ca    8.6      07 Oct 2024 05:28  Phos  2.7     10-07  Mg     1.9     10-07    TPro  x   /  Alb  x   /  TBili  0.3  /  DBili  x   /  AST  x   /  ALT  x   /  AlkPhos  x   10-06    PT/INR - ( 07 Oct 2024 05:27 )   PT: 13.1 sec;   INR: 1.15 ratio         PTT - ( 07 Oct 2024 05:27 )  PTT:35.2 sec      Urinalysis Basic - ( 07 Oct 2024 05:28 )    Color: x / Appearance: x / SG: x / pH: x  Gluc: 113 mg/dL / Ketone: x  / Bili: x / Urobili: x   Blood: x / Protein: x / Nitrite: x   Leuk Esterase: x / RBC: x / WBC x   Sq Epi: x / Non Sq Epi: x / Bacteria: x        RADIOLOGY & ADDITIONAL TESTS:    Imaging Personally Reviewed:    Consultant(s) Notes Reviewed:      Care Discussed with Consultants/Other Providers:   DATE OF SERVICE: 10-07-24 @ 07:22    Patient is a 64y old  Female who presents with a chief complaint of GI Bleeding (06 Oct 2024 14:11)      INTERVAL/OVERNIGHT: 1 large bloody BM overnight    SUBJECTIVE: Aside from bloody BM, patient feels well. Denies lightheadedness/dizziness, SOB, chest pain, abd pain.    MEDICATIONS  (STANDING):  atorvastatin 20 milliGRAM(s) Oral at bedtime  dextrose 50% Injectable 25 Gram(s) IV Push once  glucagon  Injectable 1 milliGRAM(s) IntraMuscular once  influenza   Vaccine 0.5 milliLiter(s) IntraMuscular once  insulin glargine Injectable (LANTUS) 8 Unit(s) SubCutaneous at bedtime  insulin lispro (ADMELOG) corrective regimen sliding scale   SubCutaneous at bedtime  insulin lispro (ADMELOG) corrective regimen sliding scale   SubCutaneous three times a day before meals  insulin lispro Injectable (ADMELOG) 3 Unit(s) SubCutaneous three times a day before meals  pantoprazole  Injectable 40 milliGRAM(s) IV Push every 12 hours  potassium chloride    Tablet ER 20 milliEquivalent(s) Oral once  potassium chloride  10 mEq/100 mL IVPB 10 milliEquivalent(s) IV Intermittent every 1 hour    MEDICATIONS  (PRN):  acetaminophen     Tablet .. 650 milliGRAM(s) Oral every 6 hours PRN Temp greater or equal to 38C (100.4F), Mild Pain (1 - 3)  dextrose Oral Gel 15 Gram(s) Oral once PRN Blood Glucose LESS THAN 70 milliGRAM(s)/deciliter      Vital Signs Last 24 Hrs  T(C): 36.6 (07 Oct 2024 05:04), Max: 36.7 (06 Oct 2024 08:15)  T(F): 97.9 (07 Oct 2024 05:04), Max: 98 (06 Oct 2024 08:15)  HR: 76 (07 Oct 2024 05:04) (67 - 82)  BP: 118/63 (07 Oct 2024 05:04) (98/61 - 128/79)  BP(mean): --  RR: 18 (07 Oct 2024 05:04) (18 - 18)  SpO2: 98% (07 Oct 2024 05:04) (95% - 100%)    Parameters below as of 07 Oct 2024 05:04  Patient On (Oxygen Delivery Method): room air      CAPILLARY BLOOD GLUCOSE      POCT Blood Glucose.: 153 mg/dL (06 Oct 2024 22:17)  POCT Blood Glucose.: 178 mg/dL (06 Oct 2024 17:27)  POCT Blood Glucose.: 175 mg/dL (06 Oct 2024 12:17)  POCT Blood Glucose.: 127 mg/dL (06 Oct 2024 08:51)    I&O's Summary    06 Oct 2024 07:01  -  07 Oct 2024 07:00  --------------------------------------------------------  IN: 477 mL / OUT: 0 mL / NET: 477 mL        PHYSICAL EXAM:  GENERAL: NAD,  HEAD:  Atraumatic, Normocephalic  EYES: anicteric  NECK: Supple, No JVD  CHEST/LUNG: Clear to auscultation bilaterally; No wheeze  HEART: Regular rate and rhythm; No murmurs, rubs, or gallops  ABDOMEN: Soft, Nontender, Nondistended  EXTREMITIES: No b/l LE edema  NEUROLOGY: A&Ox3, non-focal  SKIN: No rashes or lesions    LABS:                        8.9    3.17  )-----------( 148      ( 07 Oct 2024 05:27 )             29.6     10-07    143  |  107  |  8   ----------------------------<  113[H]  3.2[L]   |  27  |  0.56    Ca    8.6      07 Oct 2024 05:28  Phos  2.7     10-07  Mg     1.9     10-07    TPro  x   /  Alb  x   /  TBili  0.3  /  DBili  x   /  AST  x   /  ALT  x   /  AlkPhos  x   10-06    PT/INR - ( 07 Oct 2024 05:27 )   PT: 13.1 sec;   INR: 1.15 ratio         PTT - ( 07 Oct 2024 05:27 )  PTT:35.2 sec      Urinalysis Basic - ( 07 Oct 2024 05:28 )    Color: x / Appearance: x / SG: x / pH: x  Gluc: 113 mg/dL / Ketone: x  / Bili: x / Urobili: x   Blood: x / Protein: x / Nitrite: x   Leuk Esterase: x / RBC: x / WBC x   Sq Epi: x / Non Sq Epi: x / Bacteria: x        RADIOLOGY & ADDITIONAL TESTS:    Imaging Personally Reviewed:    Consultant(s) Notes Reviewed:      Care Discussed with Consultants/Other Providers:

## 2024-10-07 NOTE — PROVIDER CONTACT NOTE (OTHER) - SITUATION
Pts nightime Blood Sugar 87.
Patient received in PACU, following IV sedation for colonoscopy, patient complaining of headache, tremors, and excessive belching

## 2024-10-07 NOTE — PRE-ANESTHESIA EVALUATION ADULT - WEIGHT IN KG
34 Turkmen kamala agents given her hypotension
77.1

## 2024-10-08 LAB
ANION GAP SERPL CALC-SCNC: 8 MMOL/L — SIGNIFICANT CHANGE UP (ref 5–17)
BUN SERPL-MCNC: 10 MG/DL — SIGNIFICANT CHANGE UP (ref 7–23)
CALCIUM SERPL-MCNC: 8.7 MG/DL — SIGNIFICANT CHANGE UP (ref 8.4–10.5)
CHLORIDE SERPL-SCNC: 105 MMOL/L — SIGNIFICANT CHANGE UP (ref 96–108)
CO2 SERPL-SCNC: 25 MMOL/L — SIGNIFICANT CHANGE UP (ref 22–31)
CREAT SERPL-MCNC: 0.62 MG/DL — SIGNIFICANT CHANGE UP (ref 0.5–1.3)
EGFR: 99 ML/MIN/1.73M2 — SIGNIFICANT CHANGE UP
GLUCOSE BLDC GLUCOMTR-MCNC: 106 MG/DL — HIGH (ref 70–99)
GLUCOSE BLDC GLUCOMTR-MCNC: 149 MG/DL — HIGH (ref 70–99)
GLUCOSE BLDC GLUCOMTR-MCNC: 186 MG/DL — HIGH (ref 70–99)
GLUCOSE BLDC GLUCOMTR-MCNC: 188 MG/DL — HIGH (ref 70–99)
GLUCOSE SERPL-MCNC: 118 MG/DL — HIGH (ref 70–99)
HCT VFR BLD CALC: 30.7 % — LOW (ref 34.5–45)
HGB BLD-MCNC: 9.4 G/DL — LOW (ref 11.5–15.5)
MAGNESIUM SERPL-MCNC: 1.8 MG/DL — SIGNIFICANT CHANGE UP (ref 1.6–2.6)
MCHC RBC-ENTMCNC: 25.8 PG — LOW (ref 27–34)
MCHC RBC-ENTMCNC: 30.6 GM/DL — LOW (ref 32–36)
MCV RBC AUTO: 84.3 FL — SIGNIFICANT CHANGE UP (ref 80–100)
NRBC # BLD: 0 /100 WBCS — SIGNIFICANT CHANGE UP (ref 0–0)
PHOSPHATE SERPL-MCNC: 3.2 MG/DL — SIGNIFICANT CHANGE UP (ref 2.5–4.5)
PLATELET # BLD AUTO: 156 K/UL — SIGNIFICANT CHANGE UP (ref 150–400)
POTASSIUM SERPL-MCNC: 3.4 MMOL/L — LOW (ref 3.5–5.3)
POTASSIUM SERPL-SCNC: 3.4 MMOL/L — LOW (ref 3.5–5.3)
RBC # BLD: 3.64 M/UL — LOW (ref 3.8–5.2)
RBC # FLD: 15.8 % — HIGH (ref 10.3–14.5)
SODIUM SERPL-SCNC: 138 MMOL/L — SIGNIFICANT CHANGE UP (ref 135–145)
WBC # BLD: 3.33 K/UL — LOW (ref 3.8–10.5)
WBC # FLD AUTO: 3.33 K/UL — LOW (ref 3.8–10.5)

## 2024-10-08 PROCEDURE — 99232 SBSQ HOSP IP/OBS MODERATE 35: CPT | Mod: GC

## 2024-10-08 RX ORDER — PANTOPRAZOLE SODIUM 40 MG/1
40 TABLET, DELAYED RELEASE ORAL
Refills: 0 | Status: DISCONTINUED | OUTPATIENT
Start: 2024-10-08 | End: 2024-10-09

## 2024-10-08 RX ADMIN — PANTOPRAZOLE SODIUM 40 MILLIGRAM(S): 40 TABLET, DELAYED RELEASE ORAL at 06:08

## 2024-10-08 RX ADMIN — Medication 3 UNIT(S): at 18:35

## 2024-10-08 RX ADMIN — Medication 1: at 13:04

## 2024-10-08 RX ADMIN — Medication 3 UNIT(S): at 13:04

## 2024-10-08 RX ADMIN — INSULIN GLARGINE 8 UNIT(S): 300 INJECTION, SOLUTION SUBCUTANEOUS at 21:46

## 2024-10-08 RX ADMIN — Medication 40 MILLIEQUIVALENT(S): at 09:16

## 2024-10-08 RX ADMIN — Medication 296 MILLILITER(S): at 18:34

## 2024-10-08 RX ADMIN — Medication 3 UNIT(S): at 09:17

## 2024-10-08 RX ADMIN — ATORVASTATIN CALCIUM 20 MILLIGRAM(S): 10 TABLET, FILM COATED ORAL at 21:46

## 2024-10-08 NOTE — PROGRESS NOTE ADULT - SUBJECTIVE AND OBJECTIVE BOX
DATE OF SERVICE: 10-08-24 @ 07:01    Patient is a 64y old  Female who presents with a chief complaint of GI Bleeding (07 Oct 2024 07:22)      INTERVAL/OVERNIGHT:    SUBJECTIVE:    MEDICATIONS  (STANDING):  atorvastatin 20 milliGRAM(s) Oral at bedtime  dextrose 50% Injectable 25 Gram(s) IV Push once  glucagon  Injectable 1 milliGRAM(s) IntraMuscular once  influenza   Vaccine 0.5 milliLiter(s) IntraMuscular once  insulin glargine Injectable (LANTUS) 8 Unit(s) SubCutaneous at bedtime  insulin lispro (ADMELOG) corrective regimen sliding scale   SubCutaneous at bedtime  insulin lispro (ADMELOG) corrective regimen sliding scale   SubCutaneous three times a day before meals  insulin lispro Injectable (ADMELOG) 3 Unit(s) SubCutaneous three times a day before meals  pantoprazole  Injectable 40 milliGRAM(s) IV Push every 12 hours  simethicone 80 milliGRAM(s) Chew once    MEDICATIONS  (PRN):  acetaminophen     Tablet .. 650 milliGRAM(s) Oral every 6 hours PRN Temp greater or equal to 38C (100.4F), Mild Pain (1 - 3)  dextrose Oral Gel 15 Gram(s) Oral once PRN Blood Glucose LESS THAN 70 milliGRAM(s)/deciliter      Vital Signs Last 24 Hrs  T(C): 36.6 (08 Oct 2024 04:27), Max: 36.9 (07 Oct 2024 18:08)  T(F): 97.9 (08 Oct 2024 04:27), Max: 98.4 (07 Oct 2024 18:08)  HR: 64 (08 Oct 2024 04:27) (55 - 76)  BP: 129/75 (08 Oct 2024 04:27) (112/56 - 159/72)  BP(mean): --  RR: 18 (08 Oct 2024 04:27) (13 - 22)  SpO2: 98% (08 Oct 2024 04:27) (95% - 100%)    Parameters below as of 08 Oct 2024 04:27  Patient On (Oxygen Delivery Method): room air      CAPILLARY BLOOD GLUCOSE      POCT Blood Glucose.: 264 mg/dL (07 Oct 2024 22:15)  POCT Blood Glucose.: 105 mg/dL (07 Oct 2024 20:07)  POCT Blood Glucose.: 91 mg/dL (07 Oct 2024 19:50)  POCT Blood Glucose.: 66 mg/dL (07 Oct 2024 19:28)  POCT Blood Glucose.: 124 mg/dL (07 Oct 2024 12:21)  POCT Blood Glucose.: 144 mg/dL (07 Oct 2024 08:26)    I&O's Summary      PHYSICAL EXAM:  GENERAL: NAD,  HEAD:  Atraumatic, Normocephalic  EYES: anicteric  NECK: Supple, No JVD  CHEST/LUNG: Clear to auscultation bilaterally; No wheeze  HEART: Regular rate and rhythm; No murmurs, rubs, or gallops  ABDOMEN: Soft, Nontender, Nondistended  EXTREMITIES: No b/l LE edema  NEUROLOGY: A&Ox3, non-focal  SKIN: No rashes or lesions    LABS:                        8.9    3.17  )-----------( 148      ( 07 Oct 2024 05:27 )             29.6     10-07    143  |  107  |  8   ----------------------------<  113[H]  3.2[L]   |  27  |  0.56    Ca    8.6      07 Oct 2024 05:28  Phos  2.7     10-07  Mg     1.9     10-07    TPro  x   /  Alb  x   /  TBili  0.3  /  DBili  x   /  AST  x   /  ALT  x   /  AlkPhos  x   10-06    PT/INR - ( 07 Oct 2024 05:27 )   PT: 13.1 sec;   INR: 1.15 ratio         PTT - ( 07 Oct 2024 05:27 )  PTT:35.2 sec      Urinalysis Basic - ( 07 Oct 2024 05:28 )    Color: x / Appearance: x / SG: x / pH: x  Gluc: 113 mg/dL / Ketone: x  / Bili: x / Urobili: x   Blood: x / Protein: x / Nitrite: x   Leuk Esterase: x / RBC: x / WBC x   Sq Epi: x / Non Sq Epi: x / Bacteria: x        RADIOLOGY & ADDITIONAL TESTS:    Imaging Personally Reviewed:    Consultant(s) Notes Reviewed:      Care Discussed with Consultants/Other Providers:   DATE OF SERVICE: 10-08-24 @ 07:01    Patient is a 64y old  Female who presents with a chief complaint of GI Bleeding (07 Oct 2024 07:22)      INTERVAL/OVERNIGHT: s/p colonoscopy yesterday    SUBJECTIVE: Patient denies any additional eps of bloody BMs since colonoscopyy. Denies lightheadedness dizziness    MEDICATIONS  (STANDING):  atorvastatin 20 milliGRAM(s) Oral at bedtime  dextrose 50% Injectable 25 Gram(s) IV Push once  glucagon  Injectable 1 milliGRAM(s) IntraMuscular once  influenza   Vaccine 0.5 milliLiter(s) IntraMuscular once  insulin glargine Injectable (LANTUS) 8 Unit(s) SubCutaneous at bedtime  insulin lispro (ADMELOG) corrective regimen sliding scale   SubCutaneous at bedtime  insulin lispro (ADMELOG) corrective regimen sliding scale   SubCutaneous three times a day before meals  insulin lispro Injectable (ADMELOG) 3 Unit(s) SubCutaneous three times a day before meals  pantoprazole  Injectable 40 milliGRAM(s) IV Push every 12 hours  simethicone 80 milliGRAM(s) Chew once    MEDICATIONS  (PRN):  acetaminophen     Tablet .. 650 milliGRAM(s) Oral every 6 hours PRN Temp greater or equal to 38C (100.4F), Mild Pain (1 - 3)  dextrose Oral Gel 15 Gram(s) Oral once PRN Blood Glucose LESS THAN 70 milliGRAM(s)/deciliter      Vital Signs Last 24 Hrs  T(C): 36.6 (08 Oct 2024 04:27), Max: 36.9 (07 Oct 2024 18:08)  T(F): 97.9 (08 Oct 2024 04:27), Max: 98.4 (07 Oct 2024 18:08)  HR: 64 (08 Oct 2024 04:27) (55 - 76)  BP: 129/75 (08 Oct 2024 04:27) (112/56 - 159/72)  BP(mean): --  RR: 18 (08 Oct 2024 04:27) (13 - 22)  SpO2: 98% (08 Oct 2024 04:27) (95% - 100%)    Parameters below as of 08 Oct 2024 04:27  Patient On (Oxygen Delivery Method): room air      CAPILLARY BLOOD GLUCOSE      POCT Blood Glucose.: 264 mg/dL (07 Oct 2024 22:15)  POCT Blood Glucose.: 105 mg/dL (07 Oct 2024 20:07)  POCT Blood Glucose.: 91 mg/dL (07 Oct 2024 19:50)  POCT Blood Glucose.: 66 mg/dL (07 Oct 2024 19:28)  POCT Blood Glucose.: 124 mg/dL (07 Oct 2024 12:21)  POCT Blood Glucose.: 144 mg/dL (07 Oct 2024 08:26)    I&O's Summary      PHYSICAL EXAM:  GENERAL: NAD,  HEAD:  Atraumatic, Normocephalic  EYES: anicteric  NECK: Supple, No JVD  CHEST/LUNG: Clear to auscultation bilaterally; No wheeze  HEART: Regular rate and rhythm; No murmurs, rubs, or gallops  ABDOMEN: Soft, Nontender, Nondistended  EXTREMITIES: No b/l LE edema  NEUROLOGY: A&Ox3, non-focal  SKIN: No rashes or lesions    LABS:                        8.9    3.17  )-----------( 148      ( 07 Oct 2024 05:27 )             29.6     10-07    143  |  107  |  8   ----------------------------<  113[H]  3.2[L]   |  27  |  0.56    Ca    8.6      07 Oct 2024 05:28  Phos  2.7     10-07  Mg     1.9     10-07    TPro  x   /  Alb  x   /  TBili  0.3  /  DBili  x   /  AST  x   /  ALT  x   /  AlkPhos  x   10-06    PT/INR - ( 07 Oct 2024 05:27 )   PT: 13.1 sec;   INR: 1.15 ratio         PTT - ( 07 Oct 2024 05:27 )  PTT:35.2 sec      Urinalysis Basic - ( 07 Oct 2024 05:28 )    Color: x / Appearance: x / SG: x / pH: x  Gluc: 113 mg/dL / Ketone: x  / Bili: x / Urobili: x   Blood: x / Protein: x / Nitrite: x   Leuk Esterase: x / RBC: x / WBC x   Sq Epi: x / Non Sq Epi: x / Bacteria: x      < from: Colonoscopy (10.07.24 @ 14:49) >  - The entire examined colon is normal.                       - Rare diverticulosis in the ascending colon.                       - A few apthous ulcers in the terminal ileum of uncertain significance.                        Biopsied.                       - Non-bleeding internal hemorrhoids.  Recommendation:      - Repeat colonoscopy for screening purposes within 10 years.                       - Await pathology results.    < end of copied text >      RADIOLOGY & ADDITIONAL TESTS:    Imaging Personally Reviewed:    Consultant(s) Notes Reviewed:      Care Discussed with Consultants/Other Providers:

## 2024-10-08 NOTE — DIETITIAN INITIAL EVALUATION ADULT - REASON
Nutrition focused physical exam not appropriate in current setting at this time in order to optimize po intake, pt consuming lunch at the time of RD interview. Pt reported good appetite and po intake with weight gain prior to admission. RD to perform nutrition focused physical exam at a later date if medically appropriate/feasible.

## 2024-10-08 NOTE — DIETITIAN INITIAL EVALUATION ADULT - OTHER CALCULATIONS
Estimated protein-energy needs calculated using current dosing weight of 169.9 pounds (10/7).  Defer fluid calculations to the medical team.

## 2024-10-08 NOTE — PROGRESS NOTE ADULT - ASSESSMENT
65 y/o female with PMH of HTN, DM, HLD, pernicious anemia, and previous hospitalization 1 year ago for GI bleeding of unknown source, presenting with bright-red hematochezia and dyspnea on exertion. Likely diverticular bleed

## 2024-10-08 NOTE — DIETITIAN INITIAL EVALUATION ADULT - NSFNSADHERENCEPTAFT_GEN_A_CORE
Pt stated she follows a regular diet prior to admission, consumes broccoli, collared greens and nuts daily. Pt stated she limits consumption of added sugars and refined carbohydrates in setting of diabetes, wears a CGM daily. Reports that most carbohydrates "send her sugars through the roof," so she does not consume a lot of pasta, bread, rice, cereal, oatmeal etc. Pt stated she follows with an outpatient Dietitian in [place]. A1C noted to be 7.8% (10/5), indicating suboptimal glycemic control for age.     Pt endorsed good appetite and po intake at baseline with no recent significant changes prior to admission.

## 2024-10-08 NOTE — DIETITIAN INITIAL EVALUATION ADULT - ETIOLOGY
related to fiber restricted nutrition therapy, diabetes nutrition therapy related to inability to meet sufficient protein-energy needs in setting of inadequate diet, institutional food dislike

## 2024-10-08 NOTE — PROGRESS NOTE ADULT - ASSESSMENT
65 yo F with PMH of breast cancer 2023 s/p mastectomy, GI bleed 2023, pernicious anemia, HTN, HLD, and T2DM presenting for hematochezia for 3 days, found to have drop in Hgb from 14 to 11 with blood on rectal exam.     Impression:  #Hematochezia  #Diverticulosis  #Aphthous ulcers in TI  #Hx of GI bleed 2023    P/w 3d of hematochezia with brisk rectal bleeding since last night and accompanied dizziness and fatigue. Labs notable for Hgb 12.2 on admission from 14 then fell to 11.3. Suspect diverticular bleed given brisk large volume intermittent episodes of hematochezia and CT findings of colonic diverticuli and prior colonoscopy 2023 showing diverticuli in hepatic flexure and ascending colon. Pt had similar presentation 10/2023 with negative workup including small bowel enteroscopy, capsule, and colonoscopy other than diverticuli. S/p colonoscopy 10/7 with no active bleeding or signs of recent bleeding and several diverticuli seen in ascending colon and hepatic flexure and incidentally noted aphthous ulcers in TI. Remains hemodynamically stable    Recommendations:  - No further endoscopic evaluation at this time  - F/u pathology results  - Consider outpatient repeat capsule study  - Advised patient to return to hospital if rebleeds  - Trend cbc, transfuse Hgb>7, active T&S  - If hemodynamically significant bleed, obtain stat CTA    We will sign off at this time, however please call back with questions or should any worsening/persistent issues arise.  Please provide patient with Gastroenterology follow up appointment with her outpatient GI Dr Cordero.    All recommendations are tentative until note is attested by attending.     Marcela Zimmer, PGY4  Gastroenterology/Hepatology Fellow  Available on Microsoft Teams  [phone] (Long Range Pager)  [phone] (Short Range Pager [facility])    After 5 pm, please contact the on-call GI fellow for any urgent issues via the Hospital Call

## 2024-10-08 NOTE — PROVIDER CONTACT NOTE (HYPOGLYCEMIA EVENT) - NS PROVIDER CONTACT BACKGROUND-HYPO
Age: 64y    Gender: Female    POCT Blood Glucose:  264 mg/dL (10-07-24 @ 22:15)  105 mg/dL (10-07-24 @ 20:07)  91 mg/dL (10-07-24 @ 19:50)  66 mg/dL (10-07-24 @ 19:28)  124 mg/dL (10-07-24 @ 12:21)  144 mg/dL (10-07-24 @ 08:26)      eMAR:atorvastatin   20 milliGRAM(s) Oral (10-07-24 @ 23:14)    insulin glargine Injectable (LANTUS)   8 Unit(s) SubCutaneous (10-07-24 @ 23:14)    insulin lispro (ADMELOG) corrective regimen sliding scale   1 Unit(s) SubCutaneous (10-07-24 @ 23:14)

## 2024-10-08 NOTE — DIETITIAN INITIAL EVALUATION ADULT - PROBLEM SELECTOR PLAN 1
Intermittent episodes of bright red hematochezia. Hemodynamic stability favors LGIB rather than UGIB. C/b RRT on 10/4/24 for large GI bleed.  - CT negative for source of bleeding  - diverticuli on CT AP, may be source of bleeding  - appreciate GI recs  - CLD for now pending EGD/colo  - s/p 1 L LR bolus given for hypotension 2/2 hematochezia  - endo/colonoscopy planned for monday  - maintain 2 large bore IVs  - protonix 40 BID

## 2024-10-08 NOTE — DIETITIAN INITIAL EVALUATION ADULT - PROBLEM SELECTOR PLAN 6
Home regimen: Tresiba 24u qHS, Novolog 6u qAC, Jardiance 10mg qd  - FS/GRETTA  - Lantus 16u qHS  - Lispro 3u qAC

## 2024-10-08 NOTE — DIETITIAN INITIAL EVALUATION ADULT - PERTINENT MEDS FT
MEDICATIONS  (STANDING):  atorvastatin 20 milliGRAM(s) Oral at bedtime  dextrose 50% Injectable 25 Gram(s) IV Push once  glucagon  Injectable 1 milliGRAM(s) IntraMuscular once  influenza   Vaccine 0.5 milliLiter(s) IntraMuscular once  insulin glargine Injectable (LANTUS) 8 Unit(s) SubCutaneous at bedtime  insulin lispro (ADMELOG) corrective regimen sliding scale   SubCutaneous at bedtime  insulin lispro (ADMELOG) corrective regimen sliding scale   SubCutaneous three times a day before meals  insulin lispro Injectable (ADMELOG) 3 Unit(s) SubCutaneous three times a day before meals  pantoprazole    Tablet 40 milliGRAM(s) Oral before breakfast  simethicone 80 milliGRAM(s) Chew once    MEDICATIONS  (PRN):  acetaminophen     Tablet .. 650 milliGRAM(s) Oral every 6 hours PRN Temp greater or equal to 38C (100.4F), Mild Pain (1 - 3)  dextrose Oral Gel 15 Gram(s) Oral once PRN Blood Glucose LESS THAN 70 milliGRAM(s)/deciliter  magnesium citrate Oral Solution 296 milliLiter(s) Oral once PRN Constipation

## 2024-10-08 NOTE — DIETITIAN INITIAL EVALUATION ADULT - ENERGY INTAKE
Currently in-house, pt was previously on Clear Liquids from 10/4-10/7 (per orders). Pt stated she was able to consume some of the clear liquids provided. Pt advanced to DASH diet on 10/7 (per orders). Endorsed eating a sandwich and soup for dinner last night. Pt stated she disliked breakfast this morning, reported institutional food dislike. Pt stated she prefers to mainly consume grilled chicken, limits bread intake (per pt preference and in setting of DM). RD reviewed menu with patient at bedside, pt dislikes many foods, RD obtained food preferences and will honor as able. Pt aware of menu ordering procedures. Pt amenable to yogurt 2x/day and trialing Ensure Max 1x/day in-house to optimize protein-energy intake.  Fair (50-75%) Poor (<50%)

## 2024-10-08 NOTE — PROGRESS NOTE ADULT - PROBLEM SELECTOR PLAN 2
Multifactorial - acute blood loss anemia and pernicious anemia  - iron levels, ferritin, TIBC labs ordered  - monitor q12 cbc's  - follows outpatient hematologist, receives routine B12/iron infusions  - transfuse for Hgb<7  - active T&S  - blood consent in chart Multifactorial - acute blood loss anemia and pernicious anemia  - iron levels, ferritin, TIBC labs ordered  - CBC qd  - follows outpatient hematologist, receives routine B12/iron infusions  - transfuse for Hgb<7  - active T&S  - blood consent in chart

## 2024-10-08 NOTE — DIETITIAN INITIAL EVALUATION ADULT - NSFNSGIIOFT_GEN_A_CORE
Pt endorsed no nausea or vomiting at this time.  - Ordered for Mylicon, Protonix (per orders).  Pt endorsed no diarrhea or constipation, stated last BM was during bowel prep on 10/6 for colonoscopy.  - Ordered for Magnesium Citrate in-house (per orders).  Continue to monitor bowel movements and bowel movement regularity. Adjust bowel regimen as needed.

## 2024-10-08 NOTE — DIETITIAN INITIAL EVALUATION ADULT - ORAL INTAKE PTA/DIET HISTORY
Nutrition assessment completed at bedside. Pt reported good appetite and po intake prior to admission with no recent significant changes. Pt reported UBW of ~138 pounds x 2 years ago, endorsed weight gain over the past 2 years. Pt endorsed significant weight gain since starting Jardiance and discontinuing Metformin in March 2024? Pt stated her current body weight is ~170 pounds just prior to admission. Confirmed no known food allergies.

## 2024-10-08 NOTE — DIETITIAN INITIAL EVALUATION ADULT - NUTRITIONGOAL OUTCOME2
Pt will be able to provide 2 teach back points to demonstrate understanding of nutrition education and material.

## 2024-10-08 NOTE — PROGRESS NOTE ADULT - SUBJECTIVE AND OBJECTIVE BOX
Interval Events:   - s/p colonoscopy yesterday showing mild diveritculosis in ascending colon and hepatic flexure with no active bleeding  - pt denies any further hematochezia. No BM since colonoscopy  - Hgb stable at 9.4     ROS:   12 point review of systems performed and negative except otherwise noted in HPI.    Hospital Medications:  acetaminophen     Tablet .. 650 milliGRAM(s) Oral every 6 hours PRN  atorvastatin 20 milliGRAM(s) Oral at bedtime  dextrose 50% Injectable 25 Gram(s) IV Push once  dextrose Oral Gel 15 Gram(s) Oral once PRN  glucagon  Injectable 1 milliGRAM(s) IntraMuscular once  influenza   Vaccine 0.5 milliLiter(s) IntraMuscular once  insulin glargine Injectable (LANTUS) 8 Unit(s) SubCutaneous at bedtime  insulin lispro (ADMELOG) corrective regimen sliding scale   SubCutaneous at bedtime  insulin lispro (ADMELOG) corrective regimen sliding scale   SubCutaneous three times a day before meals  insulin lispro Injectable (ADMELOG) 3 Unit(s) SubCutaneous three times a day before meals  magnesium citrate Oral Solution 296 milliLiter(s) Oral once PRN  pantoprazole    Tablet 40 milliGRAM(s) Oral before breakfast  simethicone 80 milliGRAM(s) Chew once      PHYSICAL EXAM:   Vital Signs:  Vital Signs Last 24 Hrs  T(C): 36.6 (08 Oct 2024 12:25), Max: 36.9 (07 Oct 2024 18:08)  T(F): 97.9 (08 Oct 2024 12:25), Max: 98.4 (07 Oct 2024 18:08)  HR: 83 (08 Oct 2024 14:46) (55 - 83)  BP: 106/74 (08 Oct 2024 14:46) (106/74 - 159/72)  BP(mean): --  RR: 18 (08 Oct 2024 12:25) (13 - 22)  SpO2: 96% (08 Oct 2024 14:46) (96% - 100%)    Parameters below as of 08 Oct 2024 14:46  Patient On (Oxygen Delivery Method): room air      Daily Height in cm: 165.1 (07 Oct 2024 15:30)    Daily     GENERAL: no acute distress  NEURO: alert  HEENT: NCAT, no conjunctival pallor appreciated  CHEST: no respiratory distress, no accessory muscle use  CARDIAC: regular rate, +S1/S2  ABDOMEN: soft, nontender, no rebound or guarding  EXTREMITIES: warm, well perfused  SKIN: no lesions noted    LABS: reviewed                        9.4    3.33  )-----------( 156      ( 08 Oct 2024 06:54 )             30.7     10-08    138  |  105  |  10  ----------------------------<  118[H]  3.4[L]   |  25  |  0.62    Ca    8.7      08 Oct 2024 06:54  Phos  3.2     10-08  Mg     1.8     10-08          Interval Diagnostic Studies: see sunrise for full report   Interval Events:   - s/p colonoscopy yesterday showing mild diverticulosis in ascending colon and hepatic flexure with no active bleeding  - pt denies any further hematochezia. No BM since colonoscopy  - Hgb stable at 9.4     ROS:   12 point review of systems performed and negative except otherwise noted in HPI.    Hospital Medications:  acetaminophen     Tablet .. 650 milliGRAM(s) Oral every 6 hours PRN  atorvastatin 20 milliGRAM(s) Oral at bedtime  dextrose 50% Injectable 25 Gram(s) IV Push once  dextrose Oral Gel 15 Gram(s) Oral once PRN  glucagon  Injectable 1 milliGRAM(s) IntraMuscular once  influenza   Vaccine 0.5 milliLiter(s) IntraMuscular once  insulin glargine Injectable (LANTUS) 8 Unit(s) SubCutaneous at bedtime  insulin lispro (ADMELOG) corrective regimen sliding scale   SubCutaneous at bedtime  insulin lispro (ADMELOG) corrective regimen sliding scale   SubCutaneous three times a day before meals  insulin lispro Injectable (ADMELOG) 3 Unit(s) SubCutaneous three times a day before meals  magnesium citrate Oral Solution 296 milliLiter(s) Oral once PRN  pantoprazole    Tablet 40 milliGRAM(s) Oral before breakfast  simethicone 80 milliGRAM(s) Chew once      PHYSICAL EXAM:   Vital Signs:  Vital Signs Last 24 Hrs  T(C): 36.6 (08 Oct 2024 12:25), Max: 36.9 (07 Oct 2024 18:08)  T(F): 97.9 (08 Oct 2024 12:25), Max: 98.4 (07 Oct 2024 18:08)  HR: 83 (08 Oct 2024 14:46) (55 - 83)  BP: 106/74 (08 Oct 2024 14:46) (106/74 - 159/72)  BP(mean): --  RR: 18 (08 Oct 2024 12:25) (13 - 22)  SpO2: 96% (08 Oct 2024 14:46) (96% - 100%)    Parameters below as of 08 Oct 2024 14:46  Patient On (Oxygen Delivery Method): room air      Daily Height in cm: 165.1 (07 Oct 2024 15:30)    Daily     GENERAL: no acute distress  NEURO: alert  HEENT: NCAT, no conjunctival pallor appreciated  CHEST: no respiratory distress, no accessory muscle use  CARDIAC: regular rate, +S1/S2  ABDOMEN: soft, nontender, no rebound or guarding  EXTREMITIES: warm, well perfused  SKIN: no lesions noted    LABS: reviewed                        9.4    3.33  )-----------( 156      ( 08 Oct 2024 06:54 )             30.7     10-08    138  |  105  |  10  ----------------------------<  118[H]  3.4[L]   |  25  |  0.62    Ca    8.7      08 Oct 2024 06:54  Phos  3.2     10-08  Mg     1.8     10-08          Interval Diagnostic Studies: see sunrise for full report

## 2024-10-08 NOTE — DIETITIAN INITIAL EVALUATION ADULT - LITERATURE/VIDEOS GIVEN
Fiber Restricted Nutrition Therapy  Carbohydrate Counting for People With Diabetes  Plate Method for Diabetes

## 2024-10-08 NOTE — DIETITIAN INITIAL EVALUATION ADULT - PERSON TAUGHT/METHOD
Reviewed low-fiber nutrition therapy in setting of recent GI bleed including importance of limiting  fiber rich foods, fresh fruits/vegetables, whole grains, and added fiber in processed foods. Discussed chewing foods well and adequate hydration and protein intake. Discussed gradual reintroduction of fiber back into diet once cleared by MD. Pt verbalized understanding and accepted written handout.     Provided education on Nutrition Therapy for Type 2 Diabetes. Emphasis on what foods contain carbohydrates, importance of pairing carbohydrates with protein for glycemic control; choosing whole grains vs refined carbohydrates; limiting refined sugars, portion sizes. Pt expressed frustration with ongoing medical issues, food preferences, and different dietary recommendations from different sources outpatient. RD reviewed key education points, provided emotional support, and made pt aware RD remains available as needed for questions/concerns, and will follow up as able per protocol. Pt appreciative of education, left with no further questions at this time./verbal instruction/written material/patient instructed

## 2024-10-08 NOTE — DIETITIAN INITIAL EVALUATION ADULT - NSFNSNUTRHOMESUPPLEMENTFT_GEN_A_CORE
Pt stated she takes an iron supplement (through nasal inhalation) and cyanocobalamin prior to admission.  Pt stated she does not consume oral nutrition supplements prior to admission.

## 2024-10-08 NOTE — DIETITIAN INITIAL EVALUATION ADULT - SIGNS/SYMPTOMS
as evidenced by pt with moderate edema, <50% EER >/= 5 days.  as evidenced by pt with GI bleed and T2DM, with diet related questions.

## 2024-10-08 NOTE — DIETITIAN INITIAL EVALUATION ADULT - PROBLEM SELECTOR PLAN 4
1 year history of progressive dyspnea on mild exertion and LE edema, c/f possible heart failure vs. PAH vs other  - per patient report, outpatient doppler negative for DVT  - TTE  - BNP

## 2024-10-08 NOTE — DIETITIAN INITIAL EVALUATION ADULT - FACTORS AFF FOOD INTAKE
Pt previously ordered for Clear Liquids from 10/4-10/7 (per orders). Also noted with institutional food dislike./other (specify)

## 2024-10-08 NOTE — DIETITIAN INITIAL EVALUATION ADULT - REASON INDICATOR FOR ASSESSMENT
Verbal consult received from RN, Bonny Quiñonez, for education.   Source: Patient, Electronic Medical Record  Chart reviewed, events noted.

## 2024-10-08 NOTE — DIETITIAN INITIAL EVALUATION ADULT - PERTINENT LABORATORY DATA
10-08    138  |  105  |  10  ----------------------------<  118[H]  3.4[L]   |  25  |  0.62    Ca    8.7      08 Oct 2024 06:54  Phos  3.2     10-08  Mg     1.8     10-08    POCT Blood Glucose.: 186 mg/dL (10-08-24 @ 12:42)  A1C with Estimated Average Glucose Result: 7.8 % (10-05-24 @ 07:27)  A1C with Estimated Average Glucose Result: 7.5 % (10-04-24 @ 14:24)

## 2024-10-08 NOTE — PROGRESS NOTE ADULT - PROBLEM SELECTOR PLAN 3
1 year history of dyspnea on mild exertion and LE edema. Low concern for CHF given normal echo. More likely deconditioning vs. anemia  -echocardiogram with EF 71%, no diastolic dysfunction  -BNP unremarkable Detail Level: Simple Additional Notes: Patient consent was obtained to proceed with the visit and recommended plan of care after discussion of all risks and benefits, including the risks of COVID-19 exposure.

## 2024-10-08 NOTE — PROGRESS NOTE ADULT - PROBLEM SELECTOR PLAN 1
Intermittent episodes of bright red hematochezia. Hemodynamic stability favors LGIB rather than UGIB. C/b RRT on 10/4/24 for large GI bleed.  - CT negative for source of bleeding  - diverticuli on CT AP, may be source of bleeding  - appreciate GI recs  - CLD for now pending EGD/colo  - endo/colonoscopy planned for monday  - maintain 2 large bore IVs  - protonix 40 BID  - if patient has large hemodynamically significant bleed, can consider CT angio abd pelvis to localize bleed, consider IR embo  - if diverticular bleed, consider colorectal surg c/s for sigmoid colectomy Intermittent episodes of bright red hematochezia. Hemodynamic stability favors LGIB rather than UGIB. C/b RRT on 10/4/24 for large GI bleed.  - CT negative for source of bleeding  - diverticuli on CT AP, may be source of bleeding  - appreciate GI recs  - maintain 2 large bore IVs  - protonix 40 BID  - if patient has large hemodynamically significant bleed, can consider CT angio abd pelvis to localize bleed, consider IR embo  - colonoscopy 10/7 - no source of bleed; has diverticulosis  - if patient wishes, can consider outpatient sigmoid colectomy if indicated by surgery

## 2024-10-08 NOTE — DIETITIAN INITIAL EVALUATION ADULT - PROBLEM SELECTOR PLAN 3
1 year history of dyspnea on mild exertion and LE edema, c/f possible heart failure  -echocardiogram  -check BNP

## 2024-10-08 NOTE — DIETITIAN INITIAL EVALUATION ADULT - PROBLEM SELECTOR PLAN 5
Home regimen: amlodipine-olmesartan 5-20mg qd  -hold amlodipine/losartan (therapeutic interchange) iso hypotension/hypovolemia

## 2024-10-08 NOTE — DIETITIAN INITIAL EVALUATION ADULT - ADD RECOMMEND
1) Recommend Consistent Carbohydrate, DASH diet as tolerated. RD reviewed Low Fiber menu options with patient in setting of GI bleed. If bleeding resumes, recommend adding Low Fiber restriction to diet. Defer diet texture/consistency to Medical Team.   2) Recommend adding Ensure Max 1x/day to optimize protein-energy intake in-house. RD to add yogurt 2x/day to optimize protein intake.   3) Recommend adding multivitamin daily for micronutrient coverage unless contraindicated.   4) Monitor and encourage adequate PO intake, RD obtained food preferences to optimize PO intake, will honor as able. Pt with menu at bedside.  5) Monitor electrolytes, replete as needed. Monitor glucose fingersticks in setting of DM.   6) Monitor nutritional intake, tolerance to diet prescription, bowel movements, weights, labs, and skin integrity.   7) Malnutrition sticker placed in chart.

## 2024-10-08 NOTE — DIETITIAN INITIAL EVALUATION ADULT - OTHER INFO
Weights:  - UBW (per patient): 138 pounds (2 years ago) --> 170 pounds (before admission), endorsed weight gain x 2 years   - Dosing Weight (per chart): 169.9 pounds (10/7)   - Daily Weights (per flow sheets): No daily weights noted to assess at this time.   - Bed Scale Weight (taken by RD): 177.1 pounds (10/8)(bed)  - Per [facility] HIE: 170 pounds (10/8/24), 170 pounds (10/4/24), 170 pounds (9/6/24), 135 pounds (5/12/21)  Pt noted with possible 35 pound weight gain x 2 years. RD to continue to monitor weights and trends as able.     Nutritional Concerns:  - Pt with GI bleed (per chart), s/p colonoscopy on 10/7 (per chart). Per chart, "no source of bleed; has diverticulosis."  - Pt with hypertension (per chart).   - Pt with T2DM, A1C 7.8% (10/5) indicating suboptimal glycemic control. Pt w/ episode of hypoglycemia yesterday (10/7). POCT Glucose  mg/dL (10/7). Glucose 118 mg/dL today (10/8). Ordered for insulin regimen in-house (per orders).   - Potassium low on 10/7 and 10/8 (per chart), s/p repletion (per orders).  - Ordered for Magnesium Citrate in-house (per orders).

## 2024-10-09 ENCOUNTER — TRANSCRIPTION ENCOUNTER (OUTPATIENT)
Age: 64
End: 2024-10-09

## 2024-10-09 VITALS
RESPIRATION RATE: 18 BRPM | DIASTOLIC BLOOD PRESSURE: 75 MMHG | OXYGEN SATURATION: 97 % | HEART RATE: 71 BPM | SYSTOLIC BLOOD PRESSURE: 121 MMHG | TEMPERATURE: 98 F

## 2024-10-09 LAB
GLUCOSE BLDC GLUCOMTR-MCNC: 155 MG/DL — HIGH (ref 70–99)
GLUCOSE BLDC GLUCOMTR-MCNC: 161 MG/DL — HIGH (ref 70–99)
GLUCOSE BLDC GLUCOMTR-MCNC: 237 MG/DL — HIGH (ref 70–99)
HCT VFR BLD CALC: 32.2 % — LOW (ref 34.5–45)
HGB BLD-MCNC: 9.7 G/DL — LOW (ref 11.5–15.5)
MCHC RBC-ENTMCNC: 24.9 PG — LOW (ref 27–34)
MCHC RBC-ENTMCNC: 30.1 GM/DL — LOW (ref 32–36)
MCV RBC AUTO: 82.8 FL — SIGNIFICANT CHANGE UP (ref 80–100)
NRBC # BLD: 0 /100 WBCS — SIGNIFICANT CHANGE UP (ref 0–0)
PLATELET # BLD AUTO: 160 K/UL — SIGNIFICANT CHANGE UP (ref 150–400)
RBC # BLD: 3.89 M/UL — SIGNIFICANT CHANGE UP (ref 3.8–5.2)
RBC # FLD: 15.9 % — HIGH (ref 10.3–14.5)
WBC # BLD: 3.51 K/UL — LOW (ref 3.8–10.5)
WBC # FLD AUTO: 3.51 K/UL — LOW (ref 3.8–10.5)

## 2024-10-09 PROCEDURE — 99285 EMERGENCY DEPT VISIT HI MDM: CPT

## 2024-10-09 PROCEDURE — 99239 HOSP IP/OBS DSCHRG MGMT >30: CPT | Mod: GC

## 2024-10-09 PROCEDURE — 86905 BLOOD TYPING RBC ANTIGENS: CPT

## 2024-10-09 PROCEDURE — 71045 X-RAY EXAM CHEST 1 VIEW: CPT

## 2024-10-09 PROCEDURE — 84100 ASSAY OF PHOSPHORUS: CPT

## 2024-10-09 PROCEDURE — 83550 IRON BINDING TEST: CPT

## 2024-10-09 PROCEDURE — 83880 ASSAY OF NATRIURETIC PEPTIDE: CPT

## 2024-10-09 PROCEDURE — 86870 RBC ANTIBODY IDENTIFICATION: CPT

## 2024-10-09 PROCEDURE — 86850 RBC ANTIBODY SCREEN: CPT

## 2024-10-09 PROCEDURE — 93005 ELECTROCARDIOGRAM TRACING: CPT

## 2024-10-09 PROCEDURE — 86901 BLOOD TYPING SEROLOGIC RH(D): CPT

## 2024-10-09 PROCEDURE — 85610 PROTHROMBIN TIME: CPT

## 2024-10-09 PROCEDURE — 88305 TISSUE EXAM BY PATHOLOGIST: CPT

## 2024-10-09 PROCEDURE — 80048 BASIC METABOLIC PNL TOTAL CA: CPT

## 2024-10-09 PROCEDURE — 82962 GLUCOSE BLOOD TEST: CPT

## 2024-10-09 PROCEDURE — 86880 COOMBS TEST DIRECT: CPT

## 2024-10-09 PROCEDURE — 97161 PT EVAL LOW COMPLEX 20 MIN: CPT

## 2024-10-09 PROCEDURE — 74177 CT ABD & PELVIS W/CONTRAST: CPT | Mod: MC

## 2024-10-09 PROCEDURE — 80053 COMPREHEN METABOLIC PANEL: CPT

## 2024-10-09 PROCEDURE — 82247 BILIRUBIN TOTAL: CPT

## 2024-10-09 PROCEDURE — 93306 TTE W/DOPPLER COMPLETE: CPT

## 2024-10-09 PROCEDURE — 36415 COLL VENOUS BLD VENIPUNCTURE: CPT

## 2024-10-09 PROCEDURE — 83735 ASSAY OF MAGNESIUM: CPT

## 2024-10-09 PROCEDURE — 85730 THROMBOPLASTIN TIME PARTIAL: CPT

## 2024-10-09 PROCEDURE — 84484 ASSAY OF TROPONIN QUANT: CPT

## 2024-10-09 PROCEDURE — 83540 ASSAY OF IRON: CPT

## 2024-10-09 PROCEDURE — 85025 COMPLETE CBC W/AUTO DIFF WBC: CPT

## 2024-10-09 PROCEDURE — 86900 BLOOD TYPING SEROLOGIC ABO: CPT

## 2024-10-09 PROCEDURE — 93971 EXTREMITY STUDY: CPT

## 2024-10-09 PROCEDURE — 93356 MYOCRD STRAIN IMG SPCKL TRCK: CPT

## 2024-10-09 PROCEDURE — 85027 COMPLETE CBC AUTOMATED: CPT

## 2024-10-09 PROCEDURE — 86922 COMPATIBILITY TEST ANTIGLOB: CPT

## 2024-10-09 PROCEDURE — 83036 HEMOGLOBIN GLYCOSYLATED A1C: CPT

## 2024-10-09 RX ADMIN — Medication 3 UNIT(S): at 13:17

## 2024-10-09 RX ADMIN — PANTOPRAZOLE SODIUM 40 MILLIGRAM(S): 40 TABLET, DELAYED RELEASE ORAL at 05:24

## 2024-10-09 RX ADMIN — Medication 3 UNIT(S): at 17:55

## 2024-10-09 RX ADMIN — Medication 1: at 17:54

## 2024-10-09 RX ADMIN — Medication 3 UNIT(S): at 09:26

## 2024-10-09 RX ADMIN — Medication 1: at 13:17

## 2024-10-09 RX ADMIN — Medication 2: at 09:26

## 2024-10-09 NOTE — DISCHARGE NOTE PROVIDER - DETAILS OF MALNUTRITION DIAGNOSIS/DIAGNOSES
This patient has been assessed with a concern for Malnutrition and was treated during this hospitalization for the following Nutrition diagnosis/diagnoses:     -  10/08/2024: Severe protein-calorie malnutrition

## 2024-10-09 NOTE — DISCHARGE NOTE PROVIDER - NSDCFUADDAPPT_GEN_ALL_CORE_FT
APPTS ARE READY TO BE MADE: [x] YES    Best Family or Patient Contact (if needed):    Additional Information about above appointments (if needed):    1:   2:   3:     Other comments or requests:    APPTS ARE READY TO BE MADE: [x] YES    Best Family or Patient Contact (if needed):    Additional Information about above appointments (if needed):    1:   2:   3:     Other comments or requests:   Provided patient with provider referral information, however patient prefers to schedule the appointments on their own. PCP  Patient advised they did not want to proceed with scheduling appointment with the providers on their referrals. They will coordinate care on their own. Colon/Rectal

## 2024-10-09 NOTE — PROGRESS NOTE ADULT - SUBJECTIVE AND OBJECTIVE BOX
DATE OF SERVICE: 10-09-24 @ 08:09    Patient is a 64y old  Female who presents with a chief complaint of Gastrointestinal hemorrhage     (08 Oct 2024 14:03)      INTERVAL/OVERNIGHT:    SUBJECTIVE:    MEDICATIONS  (STANDING):  atorvastatin 20 milliGRAM(s) Oral at bedtime  dextrose 50% Injectable 25 Gram(s) IV Push once  glucagon  Injectable 1 milliGRAM(s) IntraMuscular once  influenza   Vaccine 0.5 milliLiter(s) IntraMuscular once  insulin glargine Injectable (LANTUS) 8 Unit(s) SubCutaneous at bedtime  insulin lispro (ADMELOG) corrective regimen sliding scale   SubCutaneous at bedtime  insulin lispro (ADMELOG) corrective regimen sliding scale   SubCutaneous three times a day before meals  insulin lispro Injectable (ADMELOG) 3 Unit(s) SubCutaneous three times a day before meals  pantoprazole    Tablet 40 milliGRAM(s) Oral before breakfast  simethicone 80 milliGRAM(s) Chew once    MEDICATIONS  (PRN):  acetaminophen     Tablet .. 650 milliGRAM(s) Oral every 6 hours PRN Temp greater or equal to 38C (100.4F), Mild Pain (1 - 3)  dextrose Oral Gel 15 Gram(s) Oral once PRN Blood Glucose LESS THAN 70 milliGRAM(s)/deciliter      Vital Signs Last 24 Hrs  T(C): 36.7 (09 Oct 2024 04:54), Max: 36.8 (08 Oct 2024 20:30)  T(F): 98 (09 Oct 2024 04:54), Max: 98.2 (08 Oct 2024 20:30)  HR: 69 (09 Oct 2024 04:54) (68 - 83)  BP: 113/66 (09 Oct 2024 04:54) (106/74 - 141/74)  BP(mean): --  RR: 18 (09 Oct 2024 04:54) (18 - 18)  SpO2: 97% (09 Oct 2024 04:54) (96% - 100%)    Parameters below as of 09 Oct 2024 04:54  Patient On (Oxygen Delivery Method): room air      CAPILLARY BLOOD GLUCOSE      POCT Blood Glucose.: 188 mg/dL (08 Oct 2024 21:18)  POCT Blood Glucose.: 149 mg/dL (08 Oct 2024 17:09)  POCT Blood Glucose.: 186 mg/dL (08 Oct 2024 12:42)  POCT Blood Glucose.: 106 mg/dL (08 Oct 2024 08:52)    I&O's Summary    08 Oct 2024 07:01  -  09 Oct 2024 07:00  --------------------------------------------------------  IN: 840 mL / OUT: 0 mL / NET: 840 mL        PHYSICAL EXAM:  GENERAL: NAD,  HEAD:  Atraumatic, Normocephalic  EYES: anicteric  NECK: Supple, No JVD  CHEST/LUNG: Clear to auscultation bilaterally; No wheeze  HEART: Regular rate and rhythm; No murmurs, rubs, or gallops  ABDOMEN: Soft, Nontender, Nondistended  EXTREMITIES: No b/l LE edema  NEUROLOGY: A&Ox3, non-focal  SKIN: No rashes or lesions    LABS:                        9.4    3.33  )-----------( 156      ( 08 Oct 2024 06:54 )             30.7     10-08    138  |  105  |  10  ----------------------------<  118[H]  3.4[L]   |  25  |  0.62    Ca    8.7      08 Oct 2024 06:54  Phos  3.2     10-08  Mg     1.8     10-08            Urinalysis Basic - ( 08 Oct 2024 06:54 )    Color: x / Appearance: x / SG: x / pH: x  Gluc: 118 mg/dL / Ketone: x  / Bili: x / Urobili: x   Blood: x / Protein: x / Nitrite: x   Leuk Esterase: x / RBC: x / WBC x   Sq Epi: x / Non Sq Epi: x / Bacteria: x        RADIOLOGY & ADDITIONAL TESTS:    Imaging Personally Reviewed:    Consultant(s) Notes Reviewed:      Care Discussed with Consultants/Other Providers:   DATE OF SERVICE: 10-09-24 @ 08:09    Patient is a 64y old  Female who presents with a chief complaint of Gastrointestinal hemorrhage     (08 Oct 2024 14:03)      INTERVAL/OVERNIGHT: NAOE    SUBJECTIVE: States she had a BM this AM that had specks of blood; the rest of the BM was normal. Otherwise feels well. Denies SOB, chest pain, lightheadednss, dizziness.    MEDICATIONS  (STANDING):  atorvastatin 20 milliGRAM(s) Oral at bedtime  dextrose 50% Injectable 25 Gram(s) IV Push once  glucagon  Injectable 1 milliGRAM(s) IntraMuscular once  influenza   Vaccine 0.5 milliLiter(s) IntraMuscular once  insulin glargine Injectable (LANTUS) 8 Unit(s) SubCutaneous at bedtime  insulin lispro (ADMELOG) corrective regimen sliding scale   SubCutaneous at bedtime  insulin lispro (ADMELOG) corrective regimen sliding scale   SubCutaneous three times a day before meals  insulin lispro Injectable (ADMELOG) 3 Unit(s) SubCutaneous three times a day before meals  pantoprazole    Tablet 40 milliGRAM(s) Oral before breakfast  simethicone 80 milliGRAM(s) Chew once    MEDICATIONS  (PRN):  acetaminophen     Tablet .. 650 milliGRAM(s) Oral every 6 hours PRN Temp greater or equal to 38C (100.4F), Mild Pain (1 - 3)  dextrose Oral Gel 15 Gram(s) Oral once PRN Blood Glucose LESS THAN 70 milliGRAM(s)/deciliter      Vital Signs Last 24 Hrs  T(C): 36.7 (09 Oct 2024 04:54), Max: 36.8 (08 Oct 2024 20:30)  T(F): 98 (09 Oct 2024 04:54), Max: 98.2 (08 Oct 2024 20:30)  HR: 69 (09 Oct 2024 04:54) (68 - 83)  BP: 113/66 (09 Oct 2024 04:54) (106/74 - 141/74)  BP(mean): --  RR: 18 (09 Oct 2024 04:54) (18 - 18)  SpO2: 97% (09 Oct 2024 04:54) (96% - 100%)    Parameters below as of 09 Oct 2024 04:54  Patient On (Oxygen Delivery Method): room air      CAPILLARY BLOOD GLUCOSE      POCT Blood Glucose.: 188 mg/dL (08 Oct 2024 21:18)  POCT Blood Glucose.: 149 mg/dL (08 Oct 2024 17:09)  POCT Blood Glucose.: 186 mg/dL (08 Oct 2024 12:42)  POCT Blood Glucose.: 106 mg/dL (08 Oct 2024 08:52)    I&O's Summary    08 Oct 2024 07:01  -  09 Oct 2024 07:00  --------------------------------------------------------  IN: 840 mL / OUT: 0 mL / NET: 840 mL        PHYSICAL EXAM:  GENERAL: NAD,  HEAD:  Atraumatic, Normocephalic  EYES: anicteric  NECK: Supple, No JVD  CHEST/LUNG: Clear to auscultation bilaterally; No wheeze  HEART: Regular rate and rhythm; No murmurs, rubs, or gallops  ABDOMEN: Soft, Nontender, Nondistended  EXTREMITIES: No b/l LE edema  NEUROLOGY: A&Ox3, non-focal  SKIN: No rashes or lesions    LABS:                        9.4    3.33  )-----------( 156      ( 08 Oct 2024 06:54 )             30.7     10-08    138  |  105  |  10  ----------------------------<  118[H]  3.4[L]   |  25  |  0.62    Ca    8.7      08 Oct 2024 06:54  Phos  3.2     10-08  Mg     1.8     10-08            Urinalysis Basic - ( 08 Oct 2024 06:54 )    Color: x / Appearance: x / SG: x / pH: x  Gluc: 118 mg/dL / Ketone: x  / Bili: x / Urobili: x   Blood: x / Protein: x / Nitrite: x   Leuk Esterase: x / RBC: x / WBC x   Sq Epi: x / Non Sq Epi: x / Bacteria: x        RADIOLOGY & ADDITIONAL TESTS:    Imaging Personally Reviewed:    Consultant(s) Notes Reviewed:      Care Discussed with Consultants/Other Providers:

## 2024-10-09 NOTE — DISCHARGE NOTE PROVIDER - NSDCMRMEDTOKEN_GEN_ALL_CORE_FT
amlodipine-olmesartan 5 mg-20 mg oral tablet: 1 tab(s) orally once a day  cyanocobalamin 500 mcg/0.1 mL nasal gel: 1 spray(s) nasal once a day  Jardiance 10 mg oral tablet: 1 tab(s) orally once a day  Lipitor 20 mg oral tablet: 1 tab(s) orally once a day (at bedtime)  NovoLOG FlexPen 100 units/mL injectable solution: 6 unit(s) injectable 3 times a day (with meals)  Tresiba FlexTouch 100 units/mL subcutaneous solution: 24 unit(s) subcutaneous once a day (at bedtime)

## 2024-10-09 NOTE — DISCHARGE NOTE PROVIDER - PROVIDER TOKENS
PROVIDER:[TOKEN:[[id number]],FOLLOWUP:[1 week],ESTABLISHEDPATIENT:[T]],PROVIDER:[TOKEN:[[id number]],FOLLOWUP:[Routine]]

## 2024-10-09 NOTE — DISCHARGE NOTE PROVIDER - CARE PROVIDERS DIRECT ADDRESSES
[email or web address]

## 2024-10-09 NOTE — DISCHARGE NOTE NURSING/CASE MANAGEMENT/SOCIAL WORK - NSDCPEFALRISK_GEN_ALL_CORE
For information on Fall & Injury Prevention, visit: [email or web address] OR  [email or web address] OR  https://www.cdc.gov/steadi/patient.html

## 2024-10-09 NOTE — DISCHARGE NOTE PROVIDER - HOSPITAL COURSE
Hospital course:  63 y/o female with PMH of HTN, DM, HLD, pernicious anemia, and previous hospitalization 1 year ago for GI bleeding of unknown source who came to the ED for evaluation following an episode of bright red blood in her stool. Seen by GI, underwent C-scope which demonstrated diverticulosis, no active bleeding. Presentation most consistent with diverticular bleed. Serial CBCs stable, patient hemodynamically stable. Bleeding improved.    Important Medication Changes and Reason:  1.     Active or Pending Issues Requiring Follow-up:  1. Titrate blood pressure medications with PCP  2. Follow up with GI  3. May consider surgical eval for definitive management of diverticulosis    Advanced Directives:   [x] Full code  [ ] DNR  [ ] Hospice    Discharge Diagnoses:  1. GI bleed, suspected diverticular bleed

## 2024-10-09 NOTE — PROGRESS NOTE ADULT - PROBLEM SELECTOR PLAN 2
Multifactorial - acute blood loss anemia and pernicious anemia  - iron levels, ferritin, TIBC labs ordered  - CBC qd  - follows outpatient hematologist, receives routine B12/iron infusions  - transfuse for Hgb<7  - active T&S  - blood consent in chart

## 2024-10-09 NOTE — PROGRESS NOTE ADULT - PROBLEM SELECTOR PLAN 3
1 year history of dyspnea on mild exertion and LE edema. Low concern for CHF given normal echo. More likely deconditioning vs. anemia  -echocardiogram with EF 71%, no diastolic dysfunction  -BNP unremarkable

## 2024-10-09 NOTE — DISCHARGE NOTE PROVIDER - CARE PROVIDER_API CALL
DUSTY ESPINO, LUANN TAYLOR  [address] [place] [address]  Phone: [phone]  Fax: [phone]  Established Patient  Follow Up Time: 1 week    Kyaw Velasquez  Colon/Rectal Surgery  [address] [place] [address] [address]  [place] [address]  Phone: [phone]  Fax: [phone]  Follow Up Time: Routine

## 2024-10-09 NOTE — PROGRESS NOTE ADULT - PROBLEM SELECTOR PLAN 1
Intermittent episodes of bright red hematochezia. Hemodynamic stability favors LGIB rather than UGIB. C/b RRT on 10/4/24 for large GI bleed.  - CT negative for source of bleeding  - diverticuli on CT AP, may be source of bleeding  - appreciate GI recs  - maintain 2 large bore IVs  - protonix 40 BID  - if patient has large hemodynamically significant bleed, can consider CT angio abd pelvis to localize bleed, consider IR embo  - colonoscopy 10/7 - no source of bleed; has diverticulosis  - if patient wishes, can consider outpatient sigmoid colectomy if indicated by surgery

## 2024-10-09 NOTE — PROGRESS NOTE ADULT - ASSESSMENT
63 y/o female with PMH of HTN, DM, HLD, pernicious anemia, and previous hospitalization 1 year ago for GI bleeding of unknown source, presenting with bright-red hematochezia and dyspnea on exertion. Likely diverticular bleed

## 2024-10-09 NOTE — PROGRESS NOTE ADULT - NUTRITIONAL ASSESSMENT
This patient has been assessed with a concern for Malnutrition and has been determined to have a diagnosis/diagnoses of Severe protein-calorie malnutrition.    This patient is being managed with:   Diet DASH/TLC-  Sodium & Cholesterol Restricted  Consistent Carbohydrate {No Snacks} (CSTCHO)  Supplement Feeding Modality:  Oral  Ensure Max Cans or Servings Per Day:  1       Frequency:  Daily  Entered: Oct  8 2024  2:51PM

## 2024-10-09 NOTE — PROGRESS NOTE ADULT - PROBLEM SELECTOR PLAN 7
-c/w home atorvastatin 20mg qd

## 2024-10-09 NOTE — PROGRESS NOTE ADULT - ATTENDING COMMENTS
Assessment/recommendations as noted.  No further hematochezia.  Episode of painless hematochezia likely attributed to diverticular hemorrhage.  Continue to monitor serial hemoglobin/hematocrit.  Observe for any recurrence of active overt lower GI bleeding.  Plan for colonoscopy on 10/7 for further evaluation of episode of lower GI bleed.
No further GI bleeding. During the colonoscopy, we inspected the right-sided diverticula and saw no evidence of recent bleeding. The terminal ileum was remarkable for rare apthous ulceration, but these were not the cause of the bleed. Awaiting pathology results. We can follow up when she is an outpatient. [place] [facility] [place] but in light of the recurrent bleeding and apthous ulceration seen in the TI, could consider repeating capsule study.
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  # pernicious anemia on vitamin B12 injection    - pt with acute onset of hematochezia with drop in H/H likely in setting of GIB  - similar episode (much worse) about a year ago at OSH requiring 11 pRBC Transfusions without finding clear source of bleeding even with extensive workups (EGD/colonoscopy, capsule, rbc tag scan)  - appreciate GI recs: s/p colonoscopy with some diverticulosis but no active bleed  - continue to trend H/H and transfuse prn  - pt amenable to CRS referral as outpatient    s/p 3 BMs this morning with some speckles of blood in the first 2 BMs but none on the 3rd one  stable for discharge  43 minutes spent    Brigida Porter MD  Division of Hospital Medicine  Contact via [employer] Teams  Office: [phone]
64 y.o. F with pmhx of DCIS 2013 s/p mastectomy, GIB and pernicious anemia on B12 injections, HLD, HTN, T2DM who presented for hematochezia x3 days. Had similar episode in 2023 when pt had hematochezia 4 days after mastectomy, underwent workup at [facility] requiring 11 transfusions per pt - underwent EGD, colonoscopy, small bowel enteroscopy and capsule study without source of bleeding. Had O/P nuclear scan w/GI also negative, referred to hematology where she receives B12 injections. RRT was called morning after admission for large amount of hematochezia, hypotension and dizziness. Received IL LR bolus, repeat H/H drawn was stable. CTAP shows colonic diverticulosis, but no active GI bleeding.    Patient only notes BRBPR when she has a BM, and states unable to have BM on current CLD - has not had a BM since Friday 10/4, no further episodes of bleeding. Hb stable.    # Anemia of unclear origin  - With extensive workup at [facility] with similar sx in 10/2023  - Ddx include diverticular bleed, hemorrhoidal bleed, mass or polyp, less likely upper GIB  - GI on board, plan for EGD +/- colonoscopy 10/7, golytely prep today 10/6  - Clear liquid diet for now, maintain 2 large bore IVs, active type and screen  - CBC q12H, consent obtained, transfuse for Hb <7  - If HD unstable, obtain stat CTA    Remainder of A&P per resident note.
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  # pernicious anemia on vitamin B12 injection    - pt with acute onset of hematochezia with drop in H/H likely in setting of GIB  - similar episode (much worse) about a year ago at OSH requiring 11 pRBC Transfusions without finding clear source of bleeding even with extensive workups (EGD/colonoscopy, capsule, rbc tag scan)  - appreciate GI recs: s/p colonoscopy with some diverticulosis but no active bleed  - continue to trend H/H and transfuse prn  - pt amenable to CRS referral as outpatient    stable for discharge  43 minutes spent    Brigida Porter MD  Division of Hospital Medicine  Contact via [employer] Teams  Office: [phone]
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    I have personally and independently provided 51 minutes in patient encounter, reviewing tests and imaging, independently obtaining a history, performing a physical examination, discussing the plan with the patient, ordering medications/tests, documenting clinical information, and coordinating care. This excludes any time spent on teaching.
64 y.o. F with pmhx of DCIS 2013 s/p mastectomy, GIB and pernicious anemia on B12 injections, HLD, HTN, T2DM who presented for hematochezia x3 days. Had similar episode in 2023 when pt had hematochezia 4 days after mastectomy, underwent workup at [facility] requiring 11 transfusions per pt - underwent EGD, colonoscopy, small bowel enteroscopy and capsule study without source of bleeding. Had O/P nuclear scan w/GI also negative, referred to hematology where she receives B12 injections. RRT was called morning after admission for large amount of hematochezia, hypotension and dizziness. Received IL LR bolus, repeat H/H drawn was stable. CTAP shows colonic diverticulosis, but no active GI bleeding.     # Anemia of unclear origin  - With extensive workup at [facility] with similar sx in 10/2023  - Ddx include diverticular bleed, hemorrhoidal bleed, mass or polyp, less likely upper GIB  - GI on board, plan for EGD +/- colonoscopy 10/7, golytely prep 10/6  - Clear liquid diet for now, maintain 2 large bore IVs, active type and screen  - CBC q12H, consent obtained, transfuse for Hb <7  - If HD unstable, obtain stat CTA    Remainder of A&P per resident note.

## 2024-10-09 NOTE — DISCHARGE NOTE PROVIDER - NSDCCPCAREPLAN_GEN_ALL_CORE_FT
PRINCIPAL DISCHARGE DIAGNOSIS  Diagnosis: Acute lower GI bleeding  Assessment and Plan of Treatment: You were evaluated in the hospital for GI bleeding. You underwent a colonoscopy, which showed diverticulosis but no active source of bleeding. The GI doctors felt that this was most consistent with a diverticular bleed. The typical course for a diverticular bleed is spontaneous resolution. Your blood counts were stable while you were in the hospital.  Please follow up with your GI doctor. Please return to the hospital if you have worsening GI bleeding, lightheadedness, dizziness, shortness of breath, or chest pain.      SECONDARY DISCHARGE DIAGNOSES  Diagnosis: Colon, diverticulosis  Assessment and Plan of Treatment:

## 2024-10-09 NOTE — DISCHARGE NOTE NURSING/CASE MANAGEMENT/SOCIAL WORK - PATIENT PORTAL LINK FT
You can access the FollowMyHealth Patient Portal offered by [facility] by registering at the following website: [email or web address]. By joining [employer]’s FollowMyHealth portal, you will also be able to view your health information using other applications (apps) compatible with our system.

## 2024-10-10 LAB — SURGICAL PATHOLOGY STUDY: SIGNIFICANT CHANGE UP

## 2024-10-14 ENCOUNTER — NON-APPOINTMENT (OUTPATIENT)
Age: 64
End: 2024-10-14

## 2024-12-11 NOTE — ED ADULT NURSE NOTE - NS ED NURSE RECORD ANOTHER VITAL SIGN
This is an inpatient,   Digitally recorded multi-montage EEG with leads placed according to the international 10/20 system  Photic stimulation was done  Hyperventilation was not done    The background of this EEG shows very minimal slowing, some with activity was 6 to 8 Hz bilateral symmetric slowing    Later on the patient did fall asleep    Mostly asleep with spindles  No asymmetry    Nothing suggesting clear-cut epileptiform activity or asymmetry  No clinical events    Photic stimulation was attempted in intermittent stepwise pattern up to the flash frequencies of 30 Hz but I did not see any driving, asymmetry or paroxysmal activity    Impression:    This is a very minimally abnormal adult awake and drowsy and asleep EEG.  The abnormality was lack of good alpha activity which is very nonspecific.  It can be seen in drowsy state or with medication    Nonspecific  No seizures but EEG like this does not rule out epilepsy   Yes

## (undated) DEVICE — SYR ALLIANCE II INFLATION 60ML

## (undated) DEVICE — TUBING SUCTION CONN 6FT STERILE

## (undated) DEVICE — IRRIGATOR BIO SHIELD

## (undated) DEVICE — SYR LUER LOK 50CC

## (undated) DEVICE — CLAMP BX HOT RAD JAW 3

## (undated) DEVICE — CATH IV SAFE BC 20G X 1.16" (PINK)

## (undated) DEVICE — BALLOON US ENDO

## (undated) DEVICE — FOLEY HOLDER STATLOCK 2 WAY ADULT

## (undated) DEVICE — BRUSH COLONOSCOPY CYTOLOGY

## (undated) DEVICE — ELCTR GROUNDING PAD ADULT COVIDIEN

## (undated) DEVICE — SOL INJ NS 0.9% 500ML 2 PORT

## (undated) DEVICE — SUCTION YANKAUER NO CONTROL VENT

## (undated) DEVICE — TUBING SUCTION 20FT

## (undated) DEVICE — BIOPSY FORCEP RADIAL JAW 4 STANDARD WITH NEEDLE

## (undated) DEVICE — PACK IV START WITH CHG

## (undated) DEVICE — SENSOR O2 FINGER ADULT

## (undated) DEVICE — TUBING IV SET GRAVITY 3Y 100" MACRO

## (undated) DEVICE — BITE BLOCK ADULT 20 X 27MM (GREEN)

## (undated) DEVICE — FORCEP RADIAL JAW 4 JUMBO 2.8MM 3.2MM 240CM ORANGE DISP

## (undated) DEVICE — POLY TRAP ETRAP

## (undated) DEVICE — CATH IV SAFE BC 22G X 1" (BLUE)